# Patient Record
Sex: FEMALE | Race: BLACK OR AFRICAN AMERICAN | NOT HISPANIC OR LATINO | Employment: OTHER | URBAN - METROPOLITAN AREA
[De-identification: names, ages, dates, MRNs, and addresses within clinical notes are randomized per-mention and may not be internally consistent; named-entity substitution may affect disease eponyms.]

---

## 2017-03-08 ENCOUNTER — OFFICE VISIT (OUTPATIENT)
Dept: SURGERY | Facility: CLINIC | Age: 62
End: 2017-03-08

## 2017-03-08 VITALS — OXYGEN SATURATION: 98 % | BODY MASS INDEX: 17.93 KG/M2 | WEIGHT: 105 LBS | HEIGHT: 64 IN | HEART RATE: 95 BPM

## 2017-03-08 DIAGNOSIS — J45.20 MILD INTERMITTENT ASTHMA WITHOUT COMPLICATION: ICD-10-CM

## 2017-03-08 DIAGNOSIS — R13.10 DYSPHAGIA, UNSPECIFIED TYPE: ICD-10-CM

## 2017-03-08 DIAGNOSIS — N18.9 CKD (CHRONIC KIDNEY DISEASE), UNSPECIFIED STAGE: ICD-10-CM

## 2017-03-08 DIAGNOSIS — Z72.0 TOBACCO USE: ICD-10-CM

## 2017-03-08 DIAGNOSIS — K11.8 PAIN OF SUBMANDIBULAR GLAND: ICD-10-CM

## 2017-03-08 DIAGNOSIS — N25.81 SECONDARY HYPERPARATHYROIDISM (HCC): Primary | ICD-10-CM

## 2017-03-08 DIAGNOSIS — I10 ESSENTIAL HYPERTENSION: ICD-10-CM

## 2017-03-08 PROCEDURE — 99204 OFFICE O/P NEW MOD 45 MIN: CPT | Performed by: SURGERY

## 2017-03-08 RX ORDER — PRAVASTATIN SODIUM 10 MG
10 TABLET ORAL NIGHTLY
COMMUNITY
End: 2022-04-08 | Stop reason: SDUPTHER

## 2017-03-08 RX ORDER — METOPROLOL TARTRATE 100 MG/1
100 TABLET ORAL 2 TIMES DAILY
COMMUNITY
Start: 2015-11-06 | End: 2023-02-03

## 2017-03-08 RX ORDER — LIDOCAINE AND PRILOCAINE 25; 25 MG/G; MG/G
CREAM TOPICAL
Refills: 11 | COMMUNITY
Start: 2017-02-22 | End: 2017-05-12

## 2017-03-08 RX ORDER — CINACALCET 60 MG/1
60 TABLET, FILM COATED ORAL EVERY EVENING
COMMUNITY
End: 2017-05-20 | Stop reason: HOSPADM

## 2017-03-08 RX ORDER — AMLODIPINE BESYLATE 10 MG/1
10 TABLET ORAL EVERY MORNING
COMMUNITY

## 2017-03-08 RX ORDER — HYDRALAZINE HYDROCHLORIDE 100 MG/1
100 TABLET, FILM COATED ORAL 3 TIMES DAILY
COMMUNITY
Start: 2016-04-03 | End: 2017-03-30

## 2017-03-08 RX ORDER — NICOTINE 21 MG/24HR
1 PATCH, TRANSDERMAL 24 HOURS TRANSDERMAL EVERY 24 HOURS
COMMUNITY
Start: 2016-04-03 | End: 2022-04-08

## 2017-03-08 RX ORDER — ALBUTEROL SULFATE 90 UG/1
1-2 AEROSOL, METERED RESPIRATORY (INHALATION) EVERY 6 HOURS
COMMUNITY

## 2017-03-17 ENCOUNTER — HOSPITAL ENCOUNTER (OUTPATIENT)
Dept: CT IMAGING | Facility: HOSPITAL | Age: 62
Discharge: HOME | End: 2017-03-17

## 2017-03-17 ENCOUNTER — HOSPITAL ENCOUNTER (OUTPATIENT)
Dept: ULTRASOUND IMAGING | Facility: HOSPITAL | Age: 62
Discharge: HOME | End: 2017-03-17

## 2017-03-17 ENCOUNTER — HOSPITAL ENCOUNTER (OUTPATIENT)
Dept: GENERAL RADIOLOGY | Facility: HOSPITAL | Age: 62
Discharge: HOME | End: 2017-03-17
Admitting: NURSE PRACTITIONER

## 2017-03-17 LAB — CREAT BLDA-MCNC: 8.2 MG/DL (ref 0.6–1.3)

## 2017-03-17 PROCEDURE — 74247: CPT

## 2017-03-17 PROCEDURE — 82565 ASSAY OF CREATININE: CPT

## 2017-03-17 PROCEDURE — 0 IOPAMIDOL 61 % SOLUTION: Performed by: NURSE PRACTITIONER

## 2017-03-17 PROCEDURE — 70492 CT SFT TSUE NCK W/O & W/DYE: CPT

## 2017-03-17 PROCEDURE — 76536 US EXAM OF HEAD AND NECK: CPT

## 2017-03-17 RX ADMIN — IOPAMIDOL 85 ML: 612 INJECTION, SOLUTION INTRAVENOUS at 09:24

## 2017-03-20 ENCOUNTER — TELEPHONE (OUTPATIENT)
Dept: SURGERY | Facility: CLINIC | Age: 62
End: 2017-03-20

## 2017-03-20 DIAGNOSIS — E04.2 MULTIPLE THYROID NODULES: Primary | ICD-10-CM

## 2017-03-20 NOTE — TELEPHONE ENCOUNTER
[place],    Please set her up for ultrasound guided FNA both lobes, right 1.7, left 2.3 cm masses and follow up office visit after that.    Lauren James MD  03/20/17

## 2017-04-03 ENCOUNTER — HOSPITAL ENCOUNTER (OUTPATIENT)
Dept: ULTRASOUND IMAGING | Facility: HOSPITAL | Age: 62
Discharge: HOME | End: 2017-04-03
Admitting: NURSE PRACTITIONER

## 2017-04-03 VITALS
RESPIRATION RATE: 16 BRPM | TEMPERATURE: 97.2 F | BODY MASS INDEX: 19.12 KG/M2 | SYSTOLIC BLOOD PRESSURE: 130 MMHG | DIASTOLIC BLOOD PRESSURE: 77 MMHG | OXYGEN SATURATION: 98 % | HEIGHT: 64 IN | HEART RATE: 73 BPM | WEIGHT: 112 LBS

## 2017-04-03 LAB
INR PPP: 1.2 (ref 0.8–1.2)
PROTHROMBIN TIME: 14.6 SECONDS (ref 12.8–15.2)

## 2017-04-03 PROCEDURE — 88112 CYTOPATH CELL ENHANCE TECH: CPT | Performed by: NURSE PRACTITIONER

## 2017-04-03 PROCEDURE — 76942 ECHO GUIDE FOR BIOPSY: CPT

## 2017-04-03 PROCEDURE — 88305 TISSUE EXAM BY PATHOLOGIST: CPT | Performed by: NURSE PRACTITIONER

## 2017-04-03 PROCEDURE — A9270 NON-COVERED ITEM OR SERVICE: HCPCS | Performed by: RADIOLOGY

## 2017-04-03 PROCEDURE — 88173 CYTOPATH EVAL FNA REPORT: CPT | Performed by: NURSE PRACTITIONER

## 2017-04-03 PROCEDURE — 88172 CYTP DX EVAL FNA 1ST EA SITE: CPT | Performed by: NURSE PRACTITIONER

## 2017-04-03 PROCEDURE — 63710000001 ALPRAZOLAM 0.5 MG TABLET: Performed by: RADIOLOGY

## 2017-04-03 RX ORDER — LIDOCAINE HYDROCHLORIDE 10 MG/ML
10 INJECTION, SOLUTION INFILTRATION; PERINEURAL ONCE
Status: COMPLETED | OUTPATIENT
Start: 2017-04-03 | End: 2017-04-03

## 2017-04-03 RX ORDER — ALPRAZOLAM 0.5 MG/1
0.5 TABLET ORAL ONCE
Status: COMPLETED | OUTPATIENT
Start: 2017-04-03 | End: 2017-04-03

## 2017-04-03 RX ADMIN — ALPRAZOLAM 0.5 MG: 0.5 TABLET ORAL at 12:43

## 2017-04-03 RX ADMIN — LIDOCAINE HYDROCHLORIDE 10 ML: 10 INJECTION, SOLUTION INFILTRATION; PERINEURAL at 13:37

## 2017-04-03 RX ADMIN — LIDOCAINE HYDROCHLORIDE 10 ML: 10 INJECTION, SOLUTION INFILTRATION; PERINEURAL at 13:36

## 2017-04-04 ENCOUNTER — TELEPHONE (OUTPATIENT)
Dept: INTERVENTIONAL RADIOLOGY/VASCULAR | Facility: HOSPITAL | Age: 62
End: 2017-04-04

## 2017-04-07 LAB
CYTO UR: NORMAL
LAB AP CASE REPORT: NORMAL
LAB AP DIAGNOSIS COMMENT: NORMAL
LAB AP NON-GYN SPECIMEN ADEQUACY: NORMAL
Lab: NORMAL
Lab: NORMAL
PATH REPORT.FINAL DX SPEC: NORMAL
PATH REPORT.GROSS SPEC: NORMAL

## 2017-04-10 ENCOUNTER — OFFICE VISIT (OUTPATIENT)
Dept: SURGERY | Facility: CLINIC | Age: 62
End: 2017-04-10

## 2017-04-10 VITALS — HEIGHT: 64 IN | WEIGHT: 105 LBS | HEART RATE: 83 BPM | BODY MASS INDEX: 17.93 KG/M2 | OXYGEN SATURATION: 97 %

## 2017-04-10 DIAGNOSIS — E21.3 HYPERPARATHYROIDISM (HCC): ICD-10-CM

## 2017-04-10 DIAGNOSIS — E04.2 MULTIPLE THYROID NODULES: ICD-10-CM

## 2017-04-10 DIAGNOSIS — Z72.0 TOBACCO USE: ICD-10-CM

## 2017-04-10 DIAGNOSIS — R13.10 DYSPHAGIA, UNSPECIFIED TYPE: Primary | ICD-10-CM

## 2017-04-10 DIAGNOSIS — N18.6 CHRONIC KIDNEY DISEASE WITH END STAGE RENAL FAILURE ON DIALYSIS (HCC): ICD-10-CM

## 2017-04-10 DIAGNOSIS — Z99.2 CHRONIC KIDNEY DISEASE WITH END STAGE RENAL FAILURE ON DIALYSIS (HCC): ICD-10-CM

## 2017-04-10 PROCEDURE — 99214 OFFICE O/P EST MOD 30 MIN: CPT | Performed by: SURGERY

## 2017-04-10 RX ORDER — HYDROCODONE BITARTRATE AND ACETAMINOPHEN 7.5; 325 MG/1; MG/1
TABLET ORAL
Refills: 0 | COMMUNITY
Start: 2017-03-27 | End: 2017-05-12

## 2017-04-10 RX ORDER — GABAPENTIN 300 MG/1
CAPSULE ORAL
Refills: 0 | COMMUNITY
Start: 2017-03-27 | End: 2017-05-12

## 2017-04-10 RX ORDER — SODIUM CHLORIDE 0.9 % (FLUSH) 0.9 %
1-10 SYRINGE (ML) INJECTION AS NEEDED
Status: CANCELLED | OUTPATIENT
Start: 2017-04-10

## 2017-04-10 RX ORDER — TIZANIDINE 4 MG/1
TABLET ORAL
Refills: 0 | COMMUNITY
Start: 2017-03-27 | End: 2017-05-12

## 2017-04-10 NOTE — PROGRESS NOTES
Chief Complaint   Patient presents with   • Follow-up     CT of neck, UGI, FNA & US        HPI    Patient is a 61 y.o. female who is here for follow-up of secondary hyperparathyroidism, with a.history of chronic kidney disease, on dialysis.   Tuesdays Thursdays and Saturdays.  She has a left upper arm graft. She is currently taking sensipar. Upon reviewing records her most recent total calcium was 9.2 on 1/19/2017.  Her intact PTH was 1500.  We have requested more updated labs from Dr. Randhawa.    She denies any abdominal pain, kidney stones, usual fatigue, bone pain, abdominal pain, or decreased concentration. She reports having extreme soreness in her neck and cramping in her legs with muscle pains.  She is hoarse still, with a sense that her throat is closing off.  This is been present for several months. She denies any unintentional weight loss.    Our workup today has not been clear as to why she is having trouble with her throat closing off for her dysphagia.  I did get a CT scan of the neck which showed cystic nodules on the left and right these being here possible lymph node cyst thyroid is an exophytic nodule or perhaps a parathyroid, particularly on the right.  Incidental emphysema was seen.    Based on the cystic nodules on CT, and the ultrasound of her thyroid showing a multinodular goiter, we did send her for an FNA of the right and left nodules, which was done at [facility] with the FNA returning benign, [place] II.    We also got an upper GI there was impingement of the esophagus by the thyroid.  This showed a cervical esophageal when mildly compromises the cervical esophagus and possible gastritis versus neoplasm of the stomach.  I thus suggested an EGD to her today.  We may need to dilate the cervical esophageal web.    She smokes cigarettes family use the nicotine patch on occasion when she is trying to quit.  She tells me she's making progress since our last visit, cutting back on her  cigarettes.    She denies any esophageal cancer lung cancer in her family.     Past Medical History:   Diagnosis Date   • Anemia    • Anxiety    • Asthma    • Coronary artery disease    • Hyperparathyroidism    • Hypertension    • Kidney disease     dialysis     Past Surgical History   Procedure Laterality Date   • Hysterectomy N/A 1989   • Breast biopsy      Left upper arm graft       Family History   Problem Relation Age of Onset   • Heart disease Mother    • Cancer Father      Social History     Social History   • Marital status: Single     Spouse name: N/A   • Number of children: N/A   • Years of education: N/A     Occupational History   • Not on file.     Social History Main Topics   • Smoking status: Current Every Day Smoker     Packs/day: 0.50     Years: 45.00   • Smokeless tobacco: Not on file   • Alcohol use No   • Drug use: Not on file   • Sexual activity: Not on file     Other Topics Concern   • Not on file     Social History Narrative         Current Outpatient Prescriptions:   •  albuterol (PROVENTIL HFA;VENTOLIN HFA) 108 (90 BASE) MCG/ACT inhaler, Inhale 1-2 puffs Every 6 (Six) Hours., Disp: , Rfl:   •  amLODIPine (NORVASC) 10 MG tablet, Take 10 mg by mouth Daily., Disp: , Rfl:   •  cinacalcet (SENSIPAR) 60 MG tablet, Take 60 mg by mouth Every Evening., Disp: , Rfl:   •  gabapentin (NEURONTIN) 300 MG capsule, TK ONE C PO TID, Disp: , Rfl: 0  •  HYDROcodone-acetaminophen (NORCO) 7.5-325 MG per tablet, TK 1 T PO TID PRN, Disp: , Rfl: 0  •  lidocaine-prilocaine (EMLA) 2.5-2.5 % cream, APPLY SMALL AMOUNT TO ACCESS SITE 1 TO 2 HOURS BEFORE DIALYSIS. COVER WITH OCCLUSIVE DRESSING (SARAN WRAP)., Disp: , Rfl: 11  •  metoprolol tartrate (LOPRESSOR) 100 MG tablet, Take 100 mg by mouth 2 (Two) Times a Day., Disp: , Rfl:   •  nicotine (NICODERM CQ) 14 MG/24HR patch, Place 1 patch on the skin Daily., Disp: , Rfl:   •  pravastatin (PRAVACHOL) 10 MG tablet, Take 10 mg by mouth Daily., Disp: , Rfl:   •  Sucroferric  "Oxyhydroxide (VELPHORO) 500 MG chewable tablet, Chew 2 tablets 3 (Three) Times a Day., Disp: , Rfl:   •  tiZANidine (ZANAFLEX) 4 MG tablet, TK 1 T PO  Q 8 H, Disp: , Rfl: 0    No Known Allergies    Preventative Medicine  Colonoscopy: none to date    Review of Systems   Constitutional: Negative for chills, diaphoresis, fatigue, fever and unexpected weight change.   HENT: Positive for trouble swallowing and voice change.    Respiratory: Negative for cough, shortness of breath and wheezing.    Cardiovascular: Negative for chest pain, palpitations and leg swelling.   Gastrointestinal: Negative for abdominal pain.   Musculoskeletal: Negative for arthralgias and back pain.   Neurological: Negative for dizziness and weakness.   Hematological: Does not bruise/bleed easily.   All other systems reviewed and are negative.      Vitals:    04/10/17 1300   Pulse: 83   SpO2: 97%   Weight: 105 lb (47.6 kg)   Height: 64\" (162.6 cm)     Body mass index is 18.02 kg/(m^2).    Physical Exam   Constitutional: She is oriented to person, place, and time. She appears well-developed.   thin   HENT:   Head: Normocephalic and atraumatic.   Eyes: Conjunctivae are normal. No scleral icterus.   Neck: Normal range of motion. No tracheal deviation present.   No distinct mass detected   Cardiovascular: Normal rate, regular rhythm and intact distal pulses.    No bruits   Pulmonary/Chest: Effort normal and breath sounds normal.   Abdominal: Soft. Bowel sounds are normal. She exhibits no distension and no mass. There is no tenderness. There is no rebound and no guarding. No hernia.   Musculoskeletal: Normal range of motion.   Neurological: She is alert and oriented to person, place, and time.   Skin: Skin is warm and dry.   Psychiatric: She has a normal mood and affect.     ASSESSMENT:  · Secondary hyperparathyroidism most recent calcium 9.1, intact PTH 1500 in 1/2017 on Sensipar   · Dysphagia, unchanged, possibly related to esophageal wet in the " cervical region, detected on upper GI,  I'm still uncertain as to whether her thyroid goiter is contributing to her dysphagia  · Gastric wall thickening, possible gastritis versus neoplasm   · CKD (chronic kidney disease with left upper graft on dialysis Tuesdays, Thursdays, and Saturdays)  · Tobacco use   · Hypertension   · Asthma   · Left thyroid 1.7 cm nodule, [place] II  · Right thyroid 2 cm nodule, [place] II  · Sensipar use  · Submandibular gland tenderness resolved    PLAN:  · EGD with possible dilatation of esophageal cervical web  · Plan for parathyroid gland resection ×4, with half gland autotransplantation into the right arm  · Consider addition of thyroid resection at the time of her parathyroid surgery, if symptoms are not abated by esophageal dilatation  · Hold Sensipar 7-10 days prior to surgery  · Vancomycin perioperatively.    We did discuss the plan for a 4 gland resection with half gland autotransplantation again.  We discussed the possibility of adding the thyroidectomy at the time of surgery.       Lauren James MD   04/10/17

## 2017-05-08 ENCOUNTER — HOSPITAL ENCOUNTER (OUTPATIENT)
Facility: HOSPITAL | Age: 62
Setting detail: HOSPITAL OUTPATIENT SURGERY
Discharge: HOME | End: 2017-05-08
Attending: SURGERY | Admitting: SURGERY

## 2017-05-08 ENCOUNTER — PREP FOR SURGERY (OUTPATIENT)
Dept: SURGERY | Facility: CLINIC | Age: 62
End: 2017-05-08

## 2017-05-08 ENCOUNTER — ANESTHESIA EVENT (OUTPATIENT)
Dept: GASTROENTEROLOGY | Facility: HOSPITAL | Age: 62
End: 2017-05-08

## 2017-05-08 ENCOUNTER — ANESTHESIA (OUTPATIENT)
Dept: GASTROENTEROLOGY | Facility: HOSPITAL | Age: 62
End: 2017-05-08

## 2017-05-08 VITALS
OXYGEN SATURATION: 100 % | DIASTOLIC BLOOD PRESSURE: 77 MMHG | WEIGHT: 107.8 LBS | RESPIRATION RATE: 14 BRPM | SYSTOLIC BLOOD PRESSURE: 139 MMHG | HEIGHT: 64 IN | TEMPERATURE: 97.7 F | BODY MASS INDEX: 18.4 KG/M2 | HEART RATE: 74 BPM

## 2017-05-08 DIAGNOSIS — E21.3 HYPERPARATHYROIDISM (HCC): ICD-10-CM

## 2017-05-08 DIAGNOSIS — R13.10 DYSPHAGIA, UNSPECIFIED TYPE: ICD-10-CM

## 2017-05-08 DIAGNOSIS — E04.0 GOITER DIFFUSE: Primary | ICD-10-CM

## 2017-05-08 PROCEDURE — 43450 DILATE ESOPHAGUS 1/MULT PASS: CPT | Performed by: SURGERY

## 2017-05-08 PROCEDURE — 25010000002 PROPOFOL 10 MG/ML EMULSION: Performed by: NURSE ANESTHETIST, CERTIFIED REGISTERED

## 2017-05-08 RX ORDER — SODIUM CHLORIDE 0.9 % (FLUSH) 0.9 %
1-10 SYRINGE (ML) INJECTION AS NEEDED
Status: DISCONTINUED | OUTPATIENT
Start: 2017-05-08 | End: 2017-05-08 | Stop reason: HOSPADM

## 2017-05-08 RX ORDER — SODIUM CHLORIDE 9 MG/ML
30 INJECTION, SOLUTION INTRAVENOUS CONTINUOUS
Status: DISCONTINUED | OUTPATIENT
Start: 2017-05-08 | End: 2017-05-08 | Stop reason: HOSPADM

## 2017-05-08 RX ORDER — LIDOCAINE HYDROCHLORIDE 20 MG/ML
INJECTION, SOLUTION INFILTRATION; PERINEURAL AS NEEDED
Status: DISCONTINUED | OUTPATIENT
Start: 2017-05-08 | End: 2017-05-08 | Stop reason: SURG

## 2017-05-08 RX ORDER — ACETAMINOPHEN 325 MG/1
650 TABLET ORAL ONCE
Status: CANCELLED | OUTPATIENT
Start: 2017-05-08 | End: 2017-05-08

## 2017-05-08 RX ORDER — CEFAZOLIN SODIUM 2 G/100ML
2 INJECTION, SOLUTION INTRAVENOUS ONCE
Status: CANCELLED | OUTPATIENT
Start: 2017-05-08 | End: 2017-05-08

## 2017-05-08 RX ORDER — PROPOFOL 10 MG/ML
VIAL (ML) INTRAVENOUS AS NEEDED
Status: DISCONTINUED | OUTPATIENT
Start: 2017-05-08 | End: 2017-05-08 | Stop reason: SURG

## 2017-05-08 RX ORDER — SODIUM CHLORIDE 0.9 % (FLUSH) 0.9 %
1-10 SYRINGE (ML) INJECTION AS NEEDED
Status: CANCELLED | OUTPATIENT
Start: 2017-05-08

## 2017-05-08 RX ORDER — PROPOFOL 10 MG/ML
VIAL (ML) INTRAVENOUS CONTINUOUS PRN
Status: DISCONTINUED | OUTPATIENT
Start: 2017-05-08 | End: 2017-05-08 | Stop reason: SURG

## 2017-05-08 RX ADMIN — PROPOFOL 200 MCG/KG/MIN: 10 INJECTION, EMULSION INTRAVENOUS at 10:17

## 2017-05-08 RX ADMIN — SODIUM CHLORIDE 30 ML/HR: 9 INJECTION, SOLUTION INTRAVENOUS at 09:42

## 2017-05-08 RX ADMIN — LIDOCAINE HYDROCHLORIDE 80 MG: 20 INJECTION, SOLUTION INFILTRATION; PERINEURAL at 10:17

## 2017-05-08 RX ADMIN — PROPOFOL 50 MG: 10 INJECTION, EMULSION INTRAVENOUS at 10:17

## 2017-05-12 ENCOUNTER — APPOINTMENT (OUTPATIENT)
Dept: PREADMISSION TESTING | Facility: HOSPITAL | Age: 62
End: 2017-05-12

## 2017-05-12 VITALS
OXYGEN SATURATION: 97 % | TEMPERATURE: 98.4 F | HEART RATE: 71 BPM | HEIGHT: 64 IN | WEIGHT: 99 LBS | RESPIRATION RATE: 18 BRPM | SYSTOLIC BLOOD PRESSURE: 107 MMHG | DIASTOLIC BLOOD PRESSURE: 71 MMHG | BODY MASS INDEX: 16.9 KG/M2

## 2017-05-12 DIAGNOSIS — E04.0 GOITER DIFFUSE: ICD-10-CM

## 2017-05-12 DIAGNOSIS — E21.3 HYPERPARATHYROIDISM (HCC): ICD-10-CM

## 2017-05-12 LAB
ALBUMIN SERPL-MCNC: 4.4 G/DL (ref 3.5–5.2)
ALBUMIN/GLOB SERPL: 1.2 G/DL
ALP SERPL-CCNC: 82 U/L (ref 39–117)
ALT SERPL W P-5'-P-CCNC: 10 U/L (ref 1–33)
ANION GAP SERPL CALCULATED.3IONS-SCNC: 19.5 MMOL/L
AST SERPL-CCNC: 17 U/L (ref 1–32)
BASOPHILS # BLD AUTO: 0.04 10*3/MM3 (ref 0–0.2)
BASOPHILS NFR BLD AUTO: 0.5 % (ref 0–1.5)
BILIRUB SERPL-MCNC: 0.3 MG/DL (ref 0.1–1.2)
BUN BLD-MCNC: 19 MG/DL (ref 8–23)
BUN/CREAT SERPL: 4.2 (ref 7–25)
CALCIUM SPEC-SCNC: 10.7 MG/DL (ref 8.6–10.5)
CALCIUM SPEC-SCNC: 10.8 MG/DL (ref 8.6–10.5)
CHLORIDE SERPL-SCNC: 93 MMOL/L (ref 98–107)
CO2 SERPL-SCNC: 26.5 MMOL/L (ref 22–29)
CREAT BLD-MCNC: 4.55 MG/DL (ref 0.57–1)
DEPRECATED RDW RBC AUTO: 55.6 FL (ref 37–54)
EOSINOPHIL # BLD AUTO: 0.15 10*3/MM3 (ref 0–0.7)
EOSINOPHIL NFR BLD AUTO: 1.7 % (ref 0.3–6.2)
ERYTHROCYTE [DISTWIDTH] IN BLOOD BY AUTOMATED COUNT: 15.3 % (ref 11.7–13)
GFR SERPL CREATININE-BSD FRML MDRD: 12 ML/MIN/1.73
GLOBULIN UR ELPH-MCNC: 3.7 GM/DL
GLUCOSE BLD-MCNC: 170 MG/DL (ref 65–99)
HCT VFR BLD AUTO: 41.5 % (ref 35.6–45.5)
HGB BLD-MCNC: 13.5 G/DL (ref 11.9–15.5)
IMM GRANULOCYTES # BLD: 0 10*3/MM3 (ref 0–0.03)
IMM GRANULOCYTES NFR BLD: 0 % (ref 0–0.5)
LYMPHOCYTES # BLD AUTO: 3.12 10*3/MM3 (ref 0.9–4.8)
LYMPHOCYTES NFR BLD AUTO: 35.3 % (ref 19.6–45.3)
MCH RBC QN AUTO: 33.1 PG (ref 26.9–32)
MCHC RBC AUTO-ENTMCNC: 32.5 G/DL (ref 32.4–36.3)
MCV RBC AUTO: 101.7 FL (ref 80.5–98.2)
MONOCYTES # BLD AUTO: 0.84 10*3/MM3 (ref 0.2–1.2)
MONOCYTES NFR BLD AUTO: 9.5 % (ref 5–12)
NEUTROPHILS # BLD AUTO: 4.69 10*3/MM3 (ref 1.9–8.1)
NEUTROPHILS NFR BLD AUTO: 53 % (ref 42.7–76)
PLATELET # BLD AUTO: 199 10*3/MM3 (ref 140–500)
PMV BLD AUTO: 10.6 FL (ref 6–12)
POTASSIUM BLD-SCNC: 4 MMOL/L (ref 3.5–5.2)
PROT SERPL-MCNC: 8.1 G/DL (ref 6–8.5)
PTH-INTACT SERPL-MCNC: 1400 PG/ML (ref 15–65)
RBC # BLD AUTO: 4.08 10*6/MM3 (ref 3.9–5.2)
SODIUM BLD-SCNC: 139 MMOL/L (ref 136–145)
WBC NRBC COR # BLD: 8.84 10*3/MM3 (ref 4.5–10.7)

## 2017-05-12 PROCEDURE — 83970 ASSAY OF PARATHORMONE: CPT | Performed by: SURGERY

## 2017-05-12 PROCEDURE — 36415 COLL VENOUS BLD VENIPUNCTURE: CPT

## 2017-05-12 PROCEDURE — 80053 COMPREHEN METABOLIC PANEL: CPT | Performed by: SURGERY

## 2017-05-12 PROCEDURE — 82652 VIT D 1 25-DIHYDROXY: CPT | Performed by: SURGERY

## 2017-05-12 PROCEDURE — 93005 ELECTROCARDIOGRAM TRACING: CPT

## 2017-05-12 PROCEDURE — 93010 ELECTROCARDIOGRAM REPORT: CPT | Performed by: INTERNAL MEDICINE

## 2017-05-12 PROCEDURE — 85025 COMPLETE CBC W/AUTO DIFF WBC: CPT | Performed by: SURGERY

## 2017-05-12 RX ORDER — TIZANIDINE 4 MG/1
4 TABLET ORAL EVERY 8 HOURS PRN
COMMUNITY

## 2017-05-12 RX ORDER — GABAPENTIN 300 MG/1
600 CAPSULE ORAL 3 TIMES DAILY
COMMUNITY
End: 2023-02-03

## 2017-05-12 RX ORDER — HYDROCODONE BITARTRATE AND ACETAMINOPHEN 7.5; 325 MG/1; MG/1
1 TABLET ORAL EVERY 6 HOURS PRN
COMMUNITY

## 2017-05-16 LAB — 1,25(OH)2D3 SERPL-MCNC: 120 PG/ML (ref 19.9–79.3)

## 2017-05-17 ENCOUNTER — HOSPITAL ENCOUNTER (OUTPATIENT)
Facility: HOSPITAL | Age: 62
Setting detail: OBSERVATION
LOS: 1 days | Discharge: HOME | End: 2017-05-20
Attending: SURGERY | Admitting: SURGERY

## 2017-05-17 ENCOUNTER — ANESTHESIA EVENT (OUTPATIENT)
Dept: PERIOP | Facility: HOSPITAL | Age: 62
End: 2017-05-17

## 2017-05-17 ENCOUNTER — ANESTHESIA (OUTPATIENT)
Dept: PERIOP | Facility: HOSPITAL | Age: 62
End: 2017-05-17

## 2017-05-17 DIAGNOSIS — E21.3 HYPERPARATHYROIDISM (HCC): ICD-10-CM

## 2017-05-17 DIAGNOSIS — E04.0 GOITER DIFFUSE: ICD-10-CM

## 2017-05-17 LAB
ANION GAP SERPL CALCULATED.3IONS-SCNC: 21 MMOL/L
BUN BLD-MCNC: 46 MG/DL (ref 8–23)
BUN/CREAT SERPL: 5 (ref 7–25)
CALCIUM SPEC-SCNC: 10.9 MG/DL (ref 8.6–10.5)
CALCIUM SPEC-SCNC: 9.4 MG/DL (ref 8.6–10.5)
CHLORIDE SERPL-SCNC: 92 MMOL/L (ref 98–107)
CO2 SERPL-SCNC: 24 MMOL/L (ref 22–29)
CREAT BLD-MCNC: 9.28 MG/DL (ref 0.57–1)
GFR SERPL CREATININE-BSD FRML MDRD: 5 ML/MIN/1.73
GLUCOSE BLD-MCNC: 88 MG/DL (ref 65–99)
POTASSIUM BLD-SCNC: 5.2 MMOL/L (ref 3.5–5.2)
PTH-INTACT SERPL-SCNC: 107.1 PG/ML (ref 15–65)
PTH-INTACT SERPL-SCNC: 1408 PG/ML (ref 15–65)
SODIUM BLD-SCNC: 137 MMOL/L (ref 136–145)

## 2017-05-17 PROCEDURE — 25010000002 ONDANSETRON PER 1 MG: Performed by: ANESTHESIOLOGY

## 2017-05-17 PROCEDURE — 60500 EXPLORE PARATHYROID GLANDS: CPT | Performed by: PHYSICIAN ASSISTANT

## 2017-05-17 PROCEDURE — 25010000002 HYDROMORPHONE PER 4 MG: Performed by: SURGERY

## 2017-05-17 PROCEDURE — 88331 PATH CONSLTJ SURG 1 BLK 1SPC: CPT | Performed by: SURGERY

## 2017-05-17 PROCEDURE — 25010000003 CEFAZOLIN IN DEXTROSE 2-4 GM/100ML-% SOLUTION: Performed by: SURGERY

## 2017-05-17 PROCEDURE — 25010000002 PROPOFOL 10 MG/ML EMULSION: Performed by: NURSE ANESTHETIST, CERTIFIED REGISTERED

## 2017-05-17 PROCEDURE — 82310 ASSAY OF CALCIUM: CPT | Performed by: SURGERY

## 2017-05-17 PROCEDURE — 60500 EXPLORE PARATHYROID GLANDS: CPT | Performed by: SURGERY

## 2017-05-17 PROCEDURE — 60240 REMOVAL OF THYROID: CPT | Performed by: SURGERY

## 2017-05-17 PROCEDURE — 60240 REMOVAL OF THYROID: CPT | Performed by: PHYSICIAN ASSISTANT

## 2017-05-17 PROCEDURE — 25010000002 VANCOMYCIN PER 500 MG: Performed by: SURGERY

## 2017-05-17 PROCEDURE — 25010000002 FENTANYL CITRATE (PF) 100 MCG/2ML SOLUTION

## 2017-05-17 PROCEDURE — 25010000002 FENTANYL CITRATE (PF) 100 MCG/2ML SOLUTION: Performed by: NURSE ANESTHETIST, CERTIFIED REGISTERED

## 2017-05-17 PROCEDURE — 25010000002 MIDAZOLAM PER 1 MG: Performed by: ANESTHESIOLOGY

## 2017-05-17 PROCEDURE — 88305 TISSUE EXAM BY PATHOLOGIST: CPT | Performed by: SURGERY

## 2017-05-17 PROCEDURE — 60512 AUTOTRANSPLANT PARATHYROID: CPT | Performed by: SURGERY

## 2017-05-17 PROCEDURE — 60512 AUTOTRANSPLANT PARATHYROID: CPT | Performed by: PHYSICIAN ASSISTANT

## 2017-05-17 PROCEDURE — 25010000002 HYDROMORPHONE PER 4 MG: Performed by: NURSE ANESTHETIST, CERTIFIED REGISTERED

## 2017-05-17 PROCEDURE — 80048 BASIC METABOLIC PNL TOTAL CA: CPT | Performed by: SURGERY

## 2017-05-17 PROCEDURE — 83970 ASSAY OF PARATHORMONE: CPT | Performed by: SURGERY

## 2017-05-17 PROCEDURE — 88307 TISSUE EXAM BY PATHOLOGIST: CPT | Performed by: SURGERY

## 2017-05-17 RX ORDER — NITROGLYCERIN 0.4 MG/1
0.4 TABLET SUBLINGUAL
Status: DISCONTINUED | OUTPATIENT
Start: 2017-05-17 | End: 2017-05-20 | Stop reason: HOSPADM

## 2017-05-17 RX ORDER — HYDROMORPHONE HYDROCHLORIDE 1 MG/ML
0.5 INJECTION, SOLUTION INTRAMUSCULAR; INTRAVENOUS; SUBCUTANEOUS
Status: DISCONTINUED | OUTPATIENT
Start: 2017-05-17 | End: 2017-05-17 | Stop reason: HOSPADM

## 2017-05-17 RX ORDER — IBUPROFEN 200 MG
1250 CAPSULE ORAL 4 TIMES DAILY
Status: DISCONTINUED | OUTPATIENT
Start: 2017-05-17 | End: 2017-05-18

## 2017-05-17 RX ORDER — PROPOFOL 10 MG/ML
VIAL (ML) INTRAVENOUS AS NEEDED
Status: DISCONTINUED | OUTPATIENT
Start: 2017-05-17 | End: 2017-05-17 | Stop reason: SURG

## 2017-05-17 RX ORDER — FENTANYL CITRATE 50 UG/ML
INJECTION, SOLUTION INTRAMUSCULAR; INTRAVENOUS AS NEEDED
Status: DISCONTINUED | OUTPATIENT
Start: 2017-05-17 | End: 2017-05-17 | Stop reason: SURG

## 2017-05-17 RX ORDER — ONDANSETRON 4 MG/1
4 TABLET, FILM COATED ORAL EVERY 6 HOURS PRN
Status: DISCONTINUED | OUTPATIENT
Start: 2017-05-17 | End: 2017-05-20 | Stop reason: HOSPADM

## 2017-05-17 RX ORDER — BUPIVACAINE HYDROCHLORIDE AND EPINEPHRINE 5; 5 MG/ML; UG/ML
INJECTION, SOLUTION PERINEURAL AS NEEDED
Status: DISCONTINUED | OUTPATIENT
Start: 2017-05-17 | End: 2017-05-17 | Stop reason: HOSPADM

## 2017-05-17 RX ORDER — FENTANYL CITRATE 50 UG/ML
50 INJECTION, SOLUTION INTRAMUSCULAR; INTRAVENOUS
Status: DISCONTINUED | OUTPATIENT
Start: 2017-05-17 | End: 2017-05-17 | Stop reason: HOSPADM

## 2017-05-17 RX ORDER — PROMETHAZINE HYDROCHLORIDE 25 MG/1
25 TABLET ORAL ONCE AS NEEDED
Status: DISCONTINUED | OUTPATIENT
Start: 2017-05-17 | End: 2017-05-17 | Stop reason: HOSPADM

## 2017-05-17 RX ORDER — PROMETHAZINE HYDROCHLORIDE 25 MG/ML
12.5 INJECTION, SOLUTION INTRAMUSCULAR; INTRAVENOUS EVERY 6 HOURS PRN
Status: DISCONTINUED | OUTPATIENT
Start: 2017-05-17 | End: 2017-05-20 | Stop reason: HOSPADM

## 2017-05-17 RX ORDER — PROMETHAZINE HYDROCHLORIDE 25 MG/ML
12.5 INJECTION, SOLUTION INTRAMUSCULAR; INTRAVENOUS EVERY 4 HOURS PRN
Status: DISCONTINUED | OUTPATIENT
Start: 2017-05-17 | End: 2017-05-20 | Stop reason: HOSPADM

## 2017-05-17 RX ORDER — HYDROMORPHONE HYDROCHLORIDE 1 MG/ML
0.5 INJECTION, SOLUTION INTRAMUSCULAR; INTRAVENOUS; SUBCUTANEOUS
Status: DISCONTINUED | OUTPATIENT
Start: 2017-05-17 | End: 2017-05-20 | Stop reason: HOSPADM

## 2017-05-17 RX ORDER — TIZANIDINE 4 MG/1
4 TABLET ORAL EVERY 8 HOURS PRN
Status: DISCONTINUED | OUTPATIENT
Start: 2017-05-17 | End: 2017-05-20 | Stop reason: HOSPADM

## 2017-05-17 RX ORDER — LABETALOL HYDROCHLORIDE 5 MG/ML
5 INJECTION, SOLUTION INTRAVENOUS
Status: DISCONTINUED | OUTPATIENT
Start: 2017-05-17 | End: 2017-05-17 | Stop reason: HOSPADM

## 2017-05-17 RX ORDER — ALBUTEROL SULFATE 2.5 MG/3ML
2.5 SOLUTION RESPIRATORY (INHALATION) ONCE AS NEEDED
Status: DISCONTINUED | OUTPATIENT
Start: 2017-05-17 | End: 2017-05-17 | Stop reason: HOSPADM

## 2017-05-17 RX ORDER — HYDROCODONE BITARTRATE AND ACETAMINOPHEN 5; 325 MG/1; MG/1
1 TABLET ORAL EVERY 4 HOURS PRN
Status: DISCONTINUED | OUTPATIENT
Start: 2017-05-17 | End: 2017-05-20 | Stop reason: HOSPADM

## 2017-05-17 RX ORDER — LIDOCAINE HYDROCHLORIDE 20 MG/ML
INJECTION, SOLUTION INFILTRATION; PERINEURAL AS NEEDED
Status: DISCONTINUED | OUTPATIENT
Start: 2017-05-17 | End: 2017-05-17 | Stop reason: SURG

## 2017-05-17 RX ORDER — SODIUM CHLORIDE, SODIUM LACTATE, POTASSIUM CHLORIDE, CALCIUM CHLORIDE 600; 310; 30; 20 MG/100ML; MG/100ML; MG/100ML; MG/100ML
100 INJECTION, SOLUTION INTRAVENOUS CONTINUOUS
Status: DISCONTINUED | OUTPATIENT
Start: 2017-05-17 | End: 2017-05-17

## 2017-05-17 RX ORDER — MIDAZOLAM HYDROCHLORIDE 1 MG/ML
2 INJECTION INTRAMUSCULAR; INTRAVENOUS
Status: DISCONTINUED | OUTPATIENT
Start: 2017-05-17 | End: 2017-05-17 | Stop reason: HOSPADM

## 2017-05-17 RX ORDER — NALOXONE HCL 0.4 MG/ML
0.2 VIAL (ML) INJECTION AS NEEDED
Status: DISCONTINUED | OUTPATIENT
Start: 2017-05-17 | End: 2017-05-17 | Stop reason: HOSPADM

## 2017-05-17 RX ORDER — PROMETHAZINE HYDROCHLORIDE 25 MG/ML
12.5 INJECTION, SOLUTION INTRAMUSCULAR; INTRAVENOUS ONCE AS NEEDED
Status: DISCONTINUED | OUTPATIENT
Start: 2017-05-17 | End: 2017-05-17 | Stop reason: HOSPADM

## 2017-05-17 RX ORDER — SODIUM CHLORIDE, SODIUM LACTATE, POTASSIUM CHLORIDE, CALCIUM CHLORIDE 600; 310; 30; 20 MG/100ML; MG/100ML; MG/100ML; MG/100ML
9 INJECTION, SOLUTION INTRAVENOUS CONTINUOUS
Status: DISCONTINUED | OUTPATIENT
Start: 2017-05-17 | End: 2017-05-17

## 2017-05-17 RX ORDER — SODIUM CHLORIDE 0.9 % (FLUSH) 0.9 %
1-10 SYRINGE (ML) INJECTION AS NEEDED
Status: DISCONTINUED | OUTPATIENT
Start: 2017-05-17 | End: 2017-05-17 | Stop reason: HOSPADM

## 2017-05-17 RX ORDER — PROMETHAZINE HYDROCHLORIDE 25 MG/ML
5 INJECTION, SOLUTION INTRAMUSCULAR; INTRAVENOUS
Status: DISCONTINUED | OUTPATIENT
Start: 2017-05-17 | End: 2017-05-17 | Stop reason: HOSPADM

## 2017-05-17 RX ORDER — HYDROCODONE BITARTRATE AND ACETAMINOPHEN 7.5; 325 MG/1; MG/1
1 TABLET ORAL EVERY 6 HOURS PRN
Status: DISCONTINUED | OUTPATIENT
Start: 2017-05-17 | End: 2017-05-20 | Stop reason: HOSPADM

## 2017-05-17 RX ORDER — GABAPENTIN 300 MG/1
300 CAPSULE ORAL 3 TIMES DAILY
Status: DISCONTINUED | OUTPATIENT
Start: 2017-05-17 | End: 2017-05-20 | Stop reason: HOSPADM

## 2017-05-17 RX ORDER — CEFAZOLIN SODIUM 2 G/100ML
2 INJECTION, SOLUTION INTRAVENOUS ONCE
Status: COMPLETED | OUTPATIENT
Start: 2017-05-17 | End: 2017-05-17

## 2017-05-17 RX ORDER — CALCIUM CARBONATE 200(500)MG
2 TABLET,CHEWABLE ORAL
Status: DISCONTINUED | OUTPATIENT
Start: 2017-05-17 | End: 2017-05-17

## 2017-05-17 RX ORDER — PROMETHAZINE HYDROCHLORIDE 25 MG/1
25 SUPPOSITORY RECTAL ONCE AS NEEDED
Status: DISCONTINUED | OUTPATIENT
Start: 2017-05-17 | End: 2017-05-17 | Stop reason: HOSPADM

## 2017-05-17 RX ORDER — AMLODIPINE BESYLATE 10 MG/1
10 TABLET ORAL EVERY MORNING
Status: DISCONTINUED | OUTPATIENT
Start: 2017-05-18 | End: 2017-05-20 | Stop reason: HOSPADM

## 2017-05-17 RX ORDER — ONDANSETRON 2 MG/ML
INJECTION INTRAMUSCULAR; INTRAVENOUS AS NEEDED
Status: DISCONTINUED | OUTPATIENT
Start: 2017-05-17 | End: 2017-05-17 | Stop reason: SURG

## 2017-05-17 RX ORDER — METOCLOPRAMIDE HYDROCHLORIDE 5 MG/ML
10 INJECTION INTRAMUSCULAR; INTRAVENOUS EVERY 6 HOURS PRN
Status: DISCONTINUED | OUTPATIENT
Start: 2017-05-17 | End: 2017-05-20 | Stop reason: HOSPADM

## 2017-05-17 RX ORDER — MAGNESIUM HYDROXIDE 1200 MG/15ML
LIQUID ORAL AS NEEDED
Status: DISCONTINUED | OUTPATIENT
Start: 2017-05-17 | End: 2017-05-17 | Stop reason: HOSPADM

## 2017-05-17 RX ORDER — SODIUM CHLORIDE 450 MG/100ML
45 INJECTION, SOLUTION INTRAVENOUS CONTINUOUS
Status: DISCONTINUED | OUTPATIENT
Start: 2017-05-17 | End: 2017-05-17

## 2017-05-17 RX ORDER — HYDRALAZINE HYDROCHLORIDE 20 MG/ML
5 INJECTION INTRAMUSCULAR; INTRAVENOUS
Status: DISCONTINUED | OUTPATIENT
Start: 2017-05-17 | End: 2017-05-17 | Stop reason: HOSPADM

## 2017-05-17 RX ORDER — SODIUM CHLORIDE 9 MG/ML
50 INJECTION, SOLUTION INTRAVENOUS CONTINUOUS
Status: DISCONTINUED | OUTPATIENT
Start: 2017-05-17 | End: 2017-05-19

## 2017-05-17 RX ORDER — FLUMAZENIL 0.1 MG/ML
0.2 INJECTION INTRAVENOUS AS NEEDED
Status: DISCONTINUED | OUTPATIENT
Start: 2017-05-17 | End: 2017-05-17 | Stop reason: HOSPADM

## 2017-05-17 RX ORDER — NALOXONE HCL 0.4 MG/ML
0.1 VIAL (ML) INJECTION
Status: DISCONTINUED | OUTPATIENT
Start: 2017-05-17 | End: 2017-05-20 | Stop reason: HOSPADM

## 2017-05-17 RX ORDER — CALCIUM CARBONATE 200(500)MG
3 TABLET,CHEWABLE ORAL
Status: DISCONTINUED | OUTPATIENT
Start: 2017-05-18 | End: 2017-05-17

## 2017-05-17 RX ORDER — ONDANSETRON 4 MG/1
4 TABLET, ORALLY DISINTEGRATING ORAL EVERY 6 HOURS PRN
Status: DISCONTINUED | OUTPATIENT
Start: 2017-05-17 | End: 2017-05-20 | Stop reason: HOSPADM

## 2017-05-17 RX ORDER — SODIUM CHLORIDE 9 MG/ML
9 INJECTION, SOLUTION INTRAVENOUS CONTINUOUS
Status: DISCONTINUED | OUTPATIENT
Start: 2017-05-17 | End: 2017-05-17

## 2017-05-17 RX ORDER — MIDAZOLAM HYDROCHLORIDE 1 MG/ML
1 INJECTION INTRAMUSCULAR; INTRAVENOUS
Status: DISCONTINUED | OUTPATIENT
Start: 2017-05-17 | End: 2017-05-17 | Stop reason: HOSPADM

## 2017-05-17 RX ORDER — HYDROCODONE BITARTRATE AND ACETAMINOPHEN 5; 325 MG/1; MG/1
1 TABLET ORAL ONCE AS NEEDED
Status: DISCONTINUED | OUTPATIENT
Start: 2017-05-17 | End: 2017-05-20 | Stop reason: HOSPADM

## 2017-05-17 RX ORDER — ROCURONIUM BROMIDE 10 MG/ML
INJECTION, SOLUTION INTRAVENOUS AS NEEDED
Status: DISCONTINUED | OUTPATIENT
Start: 2017-05-17 | End: 2017-05-17 | Stop reason: SURG

## 2017-05-17 RX ORDER — FENTANYL CITRATE 50 UG/ML
INJECTION, SOLUTION INTRAMUSCULAR; INTRAVENOUS
Status: COMPLETED
Start: 2017-05-17 | End: 2017-05-17

## 2017-05-17 RX ORDER — ONDANSETRON 2 MG/ML
4 INJECTION INTRAMUSCULAR; INTRAVENOUS EVERY 6 HOURS PRN
Status: DISCONTINUED | OUTPATIENT
Start: 2017-05-17 | End: 2017-05-20 | Stop reason: HOSPADM

## 2017-05-17 RX ORDER — FAMOTIDINE 10 MG/ML
20 INJECTION, SOLUTION INTRAVENOUS ONCE
Status: COMPLETED | OUTPATIENT
Start: 2017-05-17 | End: 2017-05-17

## 2017-05-17 RX ORDER — ACETAMINOPHEN 325 MG/1
650 TABLET ORAL ONCE
Status: COMPLETED | OUTPATIENT
Start: 2017-05-17 | End: 2017-05-17

## 2017-05-17 RX ORDER — NALOXONE HCL 0.4 MG/ML
0.4 VIAL (ML) INJECTION
Status: DISCONTINUED | OUTPATIENT
Start: 2017-05-17 | End: 2017-05-20 | Stop reason: HOSPADM

## 2017-05-17 RX ORDER — ALBUTEROL SULFATE 2.5 MG/3ML
2.5 SOLUTION RESPIRATORY (INHALATION) EVERY 4 HOURS PRN
Status: DISCONTINUED | OUTPATIENT
Start: 2017-05-17 | End: 2017-05-20 | Stop reason: HOSPADM

## 2017-05-17 RX ORDER — ACETAMINOPHEN 325 MG/1
650 TABLET ORAL EVERY 4 HOURS PRN
Status: DISCONTINUED | OUTPATIENT
Start: 2017-05-17 | End: 2017-05-20 | Stop reason: HOSPADM

## 2017-05-17 RX ORDER — ONDANSETRON 2 MG/ML
4 INJECTION INTRAMUSCULAR; INTRAVENOUS ONCE AS NEEDED
Status: DISCONTINUED | OUTPATIENT
Start: 2017-05-17 | End: 2017-05-17 | Stop reason: HOSPADM

## 2017-05-17 RX ORDER — CALCITRIOL 0.25 UG/1
0.25 CAPSULE, LIQUID FILLED ORAL EVERY 12 HOURS SCHEDULED
Status: DISCONTINUED | OUTPATIENT
Start: 2017-05-17 | End: 2017-05-20 | Stop reason: HOSPADM

## 2017-05-17 RX ORDER — METOPROLOL TARTRATE 100 MG/1
100 TABLET ORAL EVERY 12 HOURS SCHEDULED
Status: DISCONTINUED | OUTPATIENT
Start: 2017-05-17 | End: 2017-05-20 | Stop reason: HOSPADM

## 2017-05-17 RX ORDER — DIPHENHYDRAMINE HYDROCHLORIDE 50 MG/ML
12.5 INJECTION INTRAMUSCULAR; INTRAVENOUS
Status: DISCONTINUED | OUTPATIENT
Start: 2017-05-17 | End: 2017-05-17 | Stop reason: HOSPADM

## 2017-05-17 RX ADMIN — PROPOFOL 100 MG: 10 INJECTION, EMULSION INTRAVENOUS at 14:16

## 2017-05-17 RX ADMIN — CALCIUM CARBONATE 1250 MG: 1250 TABLET ORAL at 23:46

## 2017-05-17 RX ADMIN — HYDROMORPHONE HYDROCHLORIDE 0.5 MG: 1 INJECTION, SOLUTION INTRAMUSCULAR; INTRAVENOUS; SUBCUTANEOUS at 19:51

## 2017-05-17 RX ADMIN — FENTANYL CITRATE 50 MCG: 50 INJECTION INTRAMUSCULAR; INTRAVENOUS at 16:40

## 2017-05-17 RX ADMIN — FENTANYL CITRATE 50 MCG: 50 INJECTION INTRAMUSCULAR; INTRAVENOUS at 17:24

## 2017-05-17 RX ADMIN — CALCITRIOL 0.25 MCG: 0.25 CAPSULE, LIQUID FILLED ORAL at 23:46

## 2017-05-17 RX ADMIN — SODIUM CHLORIDE 9 ML/HR: 9 INJECTION, SOLUTION INTRAVENOUS at 13:45

## 2017-05-17 RX ADMIN — FENTANYL CITRATE 50 MCG: 50 INJECTION INTRAMUSCULAR; INTRAVENOUS at 18:16

## 2017-05-17 RX ADMIN — FENTANYL CITRATE 50 MCG: 50 INJECTION INTRAMUSCULAR; INTRAVENOUS at 19:35

## 2017-05-17 RX ADMIN — EPHEDRINE SULFATE 5 MG: 50 INJECTION INTRAMUSCULAR; INTRAVENOUS; SUBCUTANEOUS at 14:35

## 2017-05-17 RX ADMIN — CEFAZOLIN SODIUM 2 G: 2 INJECTION, SOLUTION INTRAVENOUS at 14:12

## 2017-05-17 RX ADMIN — ONDANSETRON 4 MG: 2 INJECTION INTRAMUSCULAR; INTRAVENOUS at 16:01

## 2017-05-17 RX ADMIN — SUGAMMADEX 200 MG: 100 INJECTION, SOLUTION INTRAVENOUS at 16:25

## 2017-05-17 RX ADMIN — FENTANYL CITRATE 50 MCG: 50 INJECTION INTRAMUSCULAR; INTRAVENOUS at 15:30

## 2017-05-17 RX ADMIN — HYDROMORPHONE HYDROCHLORIDE 0.5 MG: 1 INJECTION, SOLUTION INTRAMUSCULAR; INTRAVENOUS; SUBCUTANEOUS at 18:34

## 2017-05-17 RX ADMIN — MIDAZOLAM 2 MG: 1 INJECTION INTRAMUSCULAR; INTRAVENOUS at 13:51

## 2017-05-17 RX ADMIN — SODIUM CHLORIDE 50 ML/HR: 9 INJECTION, SOLUTION INTRAVENOUS at 20:54

## 2017-05-17 RX ADMIN — HYDROCODONE BITARTRATE AND ACETAMINOPHEN 1 TABLET: 5; 325 TABLET ORAL at 20:53

## 2017-05-17 RX ADMIN — ACETAMINOPHEN 650 MG: 325 TABLET ORAL at 12:07

## 2017-05-17 RX ADMIN — GABAPENTIN 300 MG: 300 CAPSULE ORAL at 23:46

## 2017-05-17 RX ADMIN — EPHEDRINE SULFATE 5 MG: 50 INJECTION INTRAMUSCULAR; INTRAVENOUS; SUBCUTANEOUS at 14:40

## 2017-05-17 RX ADMIN — FAMOTIDINE 20 MG: 10 INJECTION, SOLUTION INTRAVENOUS at 13:51

## 2017-05-17 RX ADMIN — ROCURONIUM BROMIDE 30 MG: 10 INJECTION INTRAVENOUS at 14:16

## 2017-05-17 RX ADMIN — ROCURONIUM BROMIDE 20 MG: 10 INJECTION INTRAVENOUS at 15:15

## 2017-05-17 RX ADMIN — LIDOCAINE HYDROCHLORIDE 40 MG: 20 INJECTION, SOLUTION INFILTRATION; PERINEURAL at 14:16

## 2017-05-17 RX ADMIN — HYDROMORPHONE HYDROCHLORIDE 0.5 MG: 1 INJECTION, SOLUTION INTRAMUSCULAR; INTRAVENOUS; SUBCUTANEOUS at 17:59

## 2017-05-17 RX ADMIN — HYDROMORPHONE HYDROCHLORIDE 0.5 MG: 1 INJECTION, SOLUTION INTRAMUSCULAR; INTRAVENOUS; SUBCUTANEOUS at 23:50

## 2017-05-17 RX ADMIN — FENTANYL CITRATE 50 MCG: 50 INJECTION INTRAMUSCULAR; INTRAVENOUS at 14:12

## 2017-05-17 RX ADMIN — EPHEDRINE SULFATE 5 MG: 50 INJECTION INTRAMUSCULAR; INTRAVENOUS; SUBCUTANEOUS at 14:30

## 2017-05-17 RX ADMIN — VANCOMYCIN HYDROCHLORIDE 500 MG: 500 INJECTION, POWDER, LYOPHILIZED, FOR SOLUTION INTRAVENOUS at 13:46

## 2017-05-18 PROBLEM — E04.0 GOITER DIFFUSE: Status: ACTIVE | Noted: 2017-05-18

## 2017-05-18 LAB
ALBUMIN SERPL-MCNC: 3.3 G/DL (ref 3.5–5.2)
ANION GAP SERPL CALCULATED.3IONS-SCNC: 19.2 MMOL/L
BUN BLD-MCNC: 48 MG/DL (ref 8–23)
BUN/CREAT SERPL: 4.9 (ref 7–25)
CA-I BLD-MCNC: 4.2 MG/DL (ref 4.6–5.4)
CA-I BLD-MCNC: 4.5 MG/DL (ref 4.6–5.4)
CA-I BLD-MCNC: 4.8 MG/DL (ref 4.6–5.4)
CA-I SERPL ISE-MCNC: 1.06 MMOL/L (ref 1.1–1.35)
CA-I SERPL ISE-MCNC: 1.13 MMOL/L (ref 1.1–1.35)
CA-I SERPL ISE-MCNC: 1.19 MMOL/L (ref 1.1–1.35)
CALCIUM SPEC-SCNC: 8 MG/DL (ref 8.6–10.5)
CALCIUM SPEC-SCNC: 8.2 MG/DL (ref 8.6–10.5)
CHLORIDE SERPL-SCNC: 92 MMOL/L (ref 98–107)
CO2 SERPL-SCNC: 19.8 MMOL/L (ref 22–29)
CREAT BLD-MCNC: 9.75 MG/DL (ref 0.57–1)
GFR SERPL CREATININE-BSD FRML MDRD: 5 ML/MIN/1.73
GLUCOSE BLD-MCNC: 71 MG/DL (ref 65–99)
MAGNESIUM SERPL-MCNC: 1.9 MG/DL (ref 1.6–2.4)
MAGNESIUM SERPL-MCNC: 2.1 MG/DL (ref 1.6–2.4)
PHOSPHATE SERPL-MCNC: 10.4 MG/DL (ref 2.5–4.5)
PHOSPHATE SERPL-MCNC: 10.4 MG/DL (ref 2.5–4.5)
PHOSPHATE SERPL-MCNC: 4.7 MG/DL (ref 2.5–4.5)
POTASSIUM BLD-SCNC: 6.6 MMOL/L (ref 3.5–5.2)
PTH-INTACT SERPL-MCNC: 122.7 PG/ML (ref 15–65)
SODIUM BLD-SCNC: 131 MMOL/L (ref 136–145)

## 2017-05-18 PROCEDURE — G0378 HOSPITAL OBSERVATION PER HR: HCPCS

## 2017-05-18 PROCEDURE — 82330 ASSAY OF CALCIUM: CPT | Performed by: INTERNAL MEDICINE

## 2017-05-18 PROCEDURE — 83970 ASSAY OF PARATHORMONE: CPT | Performed by: SURGERY

## 2017-05-18 PROCEDURE — 82310 ASSAY OF CALCIUM: CPT | Performed by: SURGERY

## 2017-05-18 PROCEDURE — G0257 UNSCHED DIALYSIS ESRD PT HOS: HCPCS

## 2017-05-18 PROCEDURE — 99024 POSTOP FOLLOW-UP VISIT: CPT | Performed by: SURGERY

## 2017-05-18 PROCEDURE — 84100 ASSAY OF PHOSPHORUS: CPT | Performed by: INTERNAL MEDICINE

## 2017-05-18 PROCEDURE — 80069 RENAL FUNCTION PANEL: CPT | Performed by: INTERNAL MEDICINE

## 2017-05-18 PROCEDURE — 83735 ASSAY OF MAGNESIUM: CPT | Performed by: INTERNAL MEDICINE

## 2017-05-18 PROCEDURE — 25010000002 HYDROMORPHONE PER 4 MG: Performed by: SURGERY

## 2017-05-18 RX ORDER — SEVELAMER CARBONATE 800 MG/1
2400 TABLET, FILM COATED ORAL
Status: DISCONTINUED | OUTPATIENT
Start: 2017-05-18 | End: 2017-05-18

## 2017-05-18 RX ORDER — IBUPROFEN 200 MG
1250 CAPSULE ORAL 2 TIMES DAILY
Status: DISCONTINUED | OUTPATIENT
Start: 2017-05-19 | End: 2017-05-20 | Stop reason: HOSPADM

## 2017-05-18 RX ORDER — SEVELAMER CARBONATE 800 MG/1
2400 TABLET, FILM COATED ORAL
Status: DISCONTINUED | OUTPATIENT
Start: 2017-05-19 | End: 2017-05-20 | Stop reason: HOSPADM

## 2017-05-18 RX ORDER — LEVOTHYROXINE SODIUM 0.07 MG/1
75 TABLET ORAL
Status: DISCONTINUED | OUTPATIENT
Start: 2017-05-18 | End: 2017-05-20 | Stop reason: HOSPADM

## 2017-05-18 RX ADMIN — SODIUM CHLORIDE 50 ML/HR: 9 INJECTION, SOLUTION INTRAVENOUS at 17:49

## 2017-05-18 RX ADMIN — METOPROLOL TARTRATE 100 MG: 100 TABLET ORAL at 22:08

## 2017-05-18 RX ADMIN — HYDROMORPHONE HYDROCHLORIDE 0.5 MG: 1 INJECTION, SOLUTION INTRAMUSCULAR; INTRAVENOUS; SUBCUTANEOUS at 05:37

## 2017-05-18 RX ADMIN — SEVELAMER CARBONATE 800 MG: 800 TABLET, FILM COATED ORAL at 17:49

## 2017-05-18 RX ADMIN — CALCIUM CARBONATE 1250 MG: 1250 TABLET ORAL at 17:49

## 2017-05-18 RX ADMIN — SEVELAMER CARBONATE 2400 MG: 800 TABLET, FILM COATED ORAL at 14:25

## 2017-05-18 RX ADMIN — CALCIUM CARBONATE 1250 MG: 1250 TABLET ORAL at 14:25

## 2017-05-18 RX ADMIN — CALCITRIOL 0.25 MCG: 0.25 CAPSULE, LIQUID FILLED ORAL at 22:04

## 2017-05-18 RX ADMIN — HYDROCODONE BITARTRATE AND ACETAMINOPHEN 1 TABLET: 5; 325 TABLET ORAL at 09:03

## 2017-05-18 RX ADMIN — GABAPENTIN 300 MG: 300 CAPSULE ORAL at 16:25

## 2017-05-18 RX ADMIN — AMLODIPINE BESYLATE 10 MG: 10 TABLET ORAL at 14:26

## 2017-05-18 RX ADMIN — HYDROCODONE BITARTRATE AND ACETAMINOPHEN 1 TABLET: 5; 325 TABLET ORAL at 14:25

## 2017-05-18 RX ADMIN — GABAPENTIN 300 MG: 300 CAPSULE ORAL at 22:04

## 2017-05-18 RX ADMIN — HYDROCODONE BITARTRATE AND ACETAMINOPHEN 1 TABLET: 5; 325 TABLET ORAL at 22:03

## 2017-05-18 RX ADMIN — LEVOTHYROXINE SODIUM 75 MCG: 75 TABLET ORAL at 16:25

## 2017-05-19 LAB
ALBUMIN SERPL-MCNC: 3.2 G/DL (ref 3.5–5.2)
ANION GAP SERPL CALCULATED.3IONS-SCNC: 16.6 MMOL/L
BUN BLD-MCNC: 26 MG/DL (ref 8–23)
BUN/CREAT SERPL: 3.9 (ref 7–25)
CA-I BLD-MCNC: 4.5 MG/DL (ref 4.6–5.4)
CA-I BLD-MCNC: 4.6 MG/DL (ref 4.6–5.4)
CA-I BLD-MCNC: 5 MG/DL (ref 4.6–5.4)
CA-I SERPL ISE-MCNC: 1.13 MMOL/L (ref 1.1–1.35)
CA-I SERPL ISE-MCNC: 1.15 MMOL/L (ref 1.1–1.35)
CA-I SERPL ISE-MCNC: 1.25 MMOL/L (ref 1.1–1.35)
CALCIUM SPEC-SCNC: 7.9 MG/DL (ref 8.6–10.5)
CHLORIDE SERPL-SCNC: 96 MMOL/L (ref 98–107)
CO2 SERPL-SCNC: 20.4 MMOL/L (ref 22–29)
CREAT BLD-MCNC: 6.72 MG/DL (ref 0.57–1)
CYTO UR: NORMAL
GFR SERPL CREATININE-BSD FRML MDRD: 8 ML/MIN/1.73
GLUCOSE BLD-MCNC: 86 MG/DL (ref 65–99)
LAB AP CASE REPORT: NORMAL
Lab: NORMAL
Lab: NORMAL
MAGNESIUM SERPL-MCNC: 1.9 MG/DL (ref 1.6–2.4)
PATH REPORT.FINAL DX SPEC: NORMAL
PATH REPORT.GROSS SPEC: NORMAL
PHOSPHATE SERPL-MCNC: 5.7 MG/DL (ref 2.5–4.5)
PHOSPHATE SERPL-MCNC: 5.7 MG/DL (ref 2.5–4.5)
POTASSIUM BLD-SCNC: 4.2 MMOL/L (ref 3.5–5.2)
SODIUM BLD-SCNC: 133 MMOL/L (ref 136–145)

## 2017-05-19 PROCEDURE — 99024 POSTOP FOLLOW-UP VISIT: CPT | Performed by: SURGERY

## 2017-05-19 PROCEDURE — 84100 ASSAY OF PHOSPHORUS: CPT | Performed by: INTERNAL MEDICINE

## 2017-05-19 PROCEDURE — 82330 ASSAY OF CALCIUM: CPT | Performed by: INTERNAL MEDICINE

## 2017-05-19 PROCEDURE — 80069 RENAL FUNCTION PANEL: CPT | Performed by: INTERNAL MEDICINE

## 2017-05-19 PROCEDURE — G0378 HOSPITAL OBSERVATION PER HR: HCPCS

## 2017-05-19 PROCEDURE — 83735 ASSAY OF MAGNESIUM: CPT | Performed by: INTERNAL MEDICINE

## 2017-05-19 RX ORDER — LEVOTHYROXINE SODIUM 0.07 MG/1
75 TABLET ORAL DAILY
Qty: 90 TABLET | Refills: 0 | Status: SHIPPED | OUTPATIENT
Start: 2017-05-19 | End: 2022-01-14

## 2017-05-19 RX ORDER — ONDANSETRON 4 MG/1
4 TABLET, FILM COATED ORAL EVERY 6 HOURS PRN
Qty: 20 TABLET | Refills: 0 | Status: SHIPPED | OUTPATIENT
Start: 2017-05-19 | End: 2022-07-22

## 2017-05-19 RX ADMIN — GABAPENTIN 300 MG: 300 CAPSULE ORAL at 20:00

## 2017-05-19 RX ADMIN — SEVELAMER CARBONATE 2400 MG: 800 TABLET, FILM COATED ORAL at 17:09

## 2017-05-19 RX ADMIN — HYDROCODONE BITARTATE AND ACETAMINOPHEN 1 TABLET: 5; 325 TABLET ORAL at 06:21

## 2017-05-19 RX ADMIN — SEVELAMER CARBONATE 2400 MG: 800 TABLET, FILM COATED ORAL at 12:01

## 2017-05-19 RX ADMIN — GABAPENTIN 300 MG: 300 CAPSULE ORAL at 17:09

## 2017-05-19 RX ADMIN — AMLODIPINE BESYLATE 10 MG: 10 TABLET ORAL at 08:39

## 2017-05-19 RX ADMIN — METOPROLOL TARTRATE 100 MG: 100 TABLET ORAL at 08:39

## 2017-05-19 RX ADMIN — CALCITRIOL 0.25 MCG: 0.25 CAPSULE, LIQUID FILLED ORAL at 08:39

## 2017-05-19 RX ADMIN — SEVELAMER CARBONATE 2400 MG: 800 TABLET, FILM COATED ORAL at 08:39

## 2017-05-19 RX ADMIN — GABAPENTIN 300 MG: 300 CAPSULE ORAL at 08:39

## 2017-05-19 RX ADMIN — METOPROLOL TARTRATE 100 MG: 100 TABLET ORAL at 20:00

## 2017-05-19 RX ADMIN — LEVOTHYROXINE SODIUM 75 MCG: 75 TABLET ORAL at 06:26

## 2017-05-19 RX ADMIN — CALCIUM CARBONATE 1250 MG: 1250 TABLET ORAL at 08:38

## 2017-05-19 RX ADMIN — HYDROCODONE BITARTRATE AND ACETAMINOPHEN 1 TABLET: 5; 325 TABLET ORAL at 20:00

## 2017-05-19 RX ADMIN — CALCITRIOL 0.25 MCG: 0.25 CAPSULE, LIQUID FILLED ORAL at 20:00

## 2017-05-19 RX ADMIN — HYDROCODONE BITARTRATE AND ACETAMINOPHEN 1 TABLET: 5; 325 TABLET ORAL at 06:26

## 2017-05-19 RX ADMIN — CALCIUM CARBONATE 1250 MG: 1250 TABLET ORAL at 19:59

## 2017-05-20 VITALS
WEIGHT: 92.59 LBS | HEART RATE: 93 BPM | BODY MASS INDEX: 15.81 KG/M2 | OXYGEN SATURATION: 91 % | DIASTOLIC BLOOD PRESSURE: 80 MMHG | HEIGHT: 64 IN | SYSTOLIC BLOOD PRESSURE: 169 MMHG | RESPIRATION RATE: 20 BRPM | TEMPERATURE: 97.9 F

## 2017-05-20 PROBLEM — E04.0 GOITER DIFFUSE: Status: RESOLVED | Noted: 2017-05-18 | Resolved: 2017-05-20

## 2017-05-20 LAB
ALBUMIN SERPL-MCNC: 3.1 G/DL (ref 3.5–5.2)
ANION GAP SERPL CALCULATED.3IONS-SCNC: 16.6 MMOL/L
BUN BLD-MCNC: 35 MG/DL (ref 8–23)
BUN/CREAT SERPL: 4 (ref 7–25)
CALCIUM SPEC-SCNC: 8.6 MG/DL (ref 8.6–10.5)
CHLORIDE SERPL-SCNC: 93 MMOL/L (ref 98–107)
CO2 SERPL-SCNC: 21.4 MMOL/L (ref 22–29)
CREAT BLD-MCNC: 8.68 MG/DL (ref 0.57–1)
GFR SERPL CREATININE-BSD FRML MDRD: 6 ML/MIN/1.73
GLUCOSE BLD-MCNC: 90 MG/DL (ref 65–99)
PHOSPHATE SERPL-MCNC: 4.2 MG/DL (ref 2.5–4.5)
POTASSIUM BLD-SCNC: 4.7 MMOL/L (ref 3.5–5.2)
SODIUM BLD-SCNC: 131 MMOL/L (ref 136–145)

## 2017-05-20 PROCEDURE — G0257 UNSCHED DIALYSIS ESRD PT HOS: HCPCS

## 2017-05-20 PROCEDURE — 99024 POSTOP FOLLOW-UP VISIT: CPT | Performed by: SURGERY

## 2017-05-20 PROCEDURE — G0378 HOSPITAL OBSERVATION PER HR: HCPCS

## 2017-05-20 PROCEDURE — 80069 RENAL FUNCTION PANEL: CPT | Performed by: INTERNAL MEDICINE

## 2017-05-20 RX ORDER — CALCITRIOL 0.25 UG/1
0.25 CAPSULE, LIQUID FILLED ORAL EVERY 12 HOURS SCHEDULED
Qty: 60 CAPSULE | Refills: 0 | Status: SHIPPED | OUTPATIENT
Start: 2017-05-20 | End: 2017-06-05

## 2017-05-20 RX ORDER — CALCIUM ACETATE 667 MG/1
2001 TABLET ORAL
Qty: 60 TABLET | Refills: 2 | Status: SHIPPED | OUTPATIENT
Start: 2017-05-20 | End: 2023-02-03

## 2017-05-20 RX ORDER — IBUPROFEN 200 MG
1250 CAPSULE ORAL 2 TIMES DAILY
Qty: 60 TABLET | Refills: 2 | Status: SHIPPED | OUTPATIENT
Start: 2017-05-20 | End: 2022-04-08

## 2017-05-20 RX ADMIN — SEVELAMER CARBONATE 2400 MG: 800 TABLET, FILM COATED ORAL at 08:05

## 2017-05-20 RX ADMIN — AMLODIPINE BESYLATE 10 MG: 10 TABLET ORAL at 14:29

## 2017-05-20 RX ADMIN — CALCITRIOL 0.25 MCG: 0.25 CAPSULE, LIQUID FILLED ORAL at 14:29

## 2017-05-20 RX ADMIN — SEVELAMER CARBONATE 2400 MG: 800 TABLET, FILM COATED ORAL at 14:28

## 2017-05-20 RX ADMIN — HYDROCODONE BITARTRATE AND ACETAMINOPHEN 1 TABLET: 5; 325 TABLET ORAL at 14:51

## 2017-05-20 RX ADMIN — LEVOTHYROXINE SODIUM 75 MCG: 75 TABLET ORAL at 06:40

## 2017-05-20 RX ADMIN — CALCIUM CARBONATE 1250 MG: 1250 TABLET ORAL at 06:40

## 2017-05-20 RX ADMIN — METOPROLOL TARTRATE 100 MG: 100 TABLET ORAL at 14:28

## 2017-05-20 RX ADMIN — GABAPENTIN 300 MG: 300 CAPSULE ORAL at 14:29

## 2017-05-22 ENCOUNTER — TELEPHONE (OUTPATIENT)
Dept: SURGERY | Facility: CLINIC | Age: 62
End: 2017-05-22

## 2017-06-05 ENCOUNTER — OFFICE VISIT (OUTPATIENT)
Dept: SURGERY | Facility: CLINIC | Age: 62
End: 2017-06-05

## 2017-06-05 DIAGNOSIS — E04.0 GOITER DIFFUSE: Primary | ICD-10-CM

## 2017-06-05 DIAGNOSIS — E21.3 HYPERPARATHYROIDISM (HCC): ICD-10-CM

## 2017-06-05 PROCEDURE — 99024 POSTOP FOLLOW-UP VISIT: CPT | Performed by: SURGERY

## 2017-06-05 RX ORDER — CALCIUM CARBONATE 500(1250)
TABLET ORAL
Refills: 2 | COMMUNITY
Start: 2017-05-22 | End: 2022-04-08

## 2017-06-08 NOTE — PROGRESS NOTES
SURGERY: [facility]  Post op Visit  Princess Marie  06/08/17    Princess is here postop from a total thyroidectomy, 4 gland parathyroid resection, with half gland autotransplantation into the right arm about 3 weeks ago.  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  The gland was free at the margins and she had one benign lymph node.  She had a multinodular goiter as well.    She needs no additional surgery for her thyroid cancer.  In the context of her renal failure, I think it has no clinical relevance.    Happily her presenting symptom which was difficulty swallowing is somewhat improved.    Unfortunately she has not gone to get her labs anytime recently that she knows about.  I asked her to make sure that she would get a hold of Dr. Randhawa's office and make sure that those labs are checked, as it appears she is not taking her calcitriol, unless they're giving it to her at the dialysis center.    I'll see her when necessary     Lauren James MD

## 2018-07-23 ENCOUNTER — OFFICE (OUTPATIENT)
Dept: URBAN - METROPOLITAN AREA CLINIC 75 | Facility: CLINIC | Age: 63
End: 2018-07-23
Payer: COMMERCIAL

## 2018-07-23 VITALS
HEIGHT: 64 IN | SYSTOLIC BLOOD PRESSURE: 128 MMHG | RESPIRATION RATE: 16 BRPM | WEIGHT: 130 LBS | HEART RATE: 86 BPM | DIASTOLIC BLOOD PRESSURE: 82 MMHG

## 2018-07-23 DIAGNOSIS — K21.9 GASTRO-ESOPHAGEAL REFLUX DISEASE WITHOUT ESOPHAGITIS: ICD-10-CM

## 2018-07-23 DIAGNOSIS — Z86.010 PERSONAL HISTORY OF COLONIC POLYPS: ICD-10-CM

## 2018-07-23 DIAGNOSIS — D64.9 ANEMIA, UNSPECIFIED: ICD-10-CM

## 2018-07-23 DIAGNOSIS — R13.10 DYSPHAGIA, UNSPECIFIED: ICD-10-CM

## 2018-07-23 DIAGNOSIS — R10.9 UNSPECIFIED ABDOMINAL PAIN: ICD-10-CM

## 2018-07-23 DIAGNOSIS — R11.0 NAUSEA: ICD-10-CM

## 2018-07-23 PROCEDURE — 99204 OFFICE O/P NEW MOD 45 MIN: CPT | Performed by: INTERNAL MEDICINE

## 2018-07-23 RX ORDER — PANTOPRAZOLE SODIUM 40 MG/1
40 TABLET, DELAYED RELEASE ORAL
Qty: 30 | Refills: 11 | Status: ACTIVE
Start: 2018-07-23

## 2018-07-24 ENCOUNTER — TRANSCRIBE ORDERS (OUTPATIENT)
Dept: ADMINISTRATIVE | Facility: HOSPITAL | Age: 63
End: 2018-07-24

## 2018-07-24 DIAGNOSIS — K21.9 GERD WITHOUT ESOPHAGITIS: Primary | ICD-10-CM

## 2018-08-03 ENCOUNTER — HOSPITAL ENCOUNTER (OUTPATIENT)
Dept: NUCLEAR MEDICINE | Facility: HOSPITAL | Age: 63
Discharge: HOME | End: 2018-08-03

## 2018-08-03 DIAGNOSIS — K21.9 GERD WITHOUT ESOPHAGITIS: ICD-10-CM

## 2018-08-03 PROCEDURE — 0 TECHNETIUM SULFUR COLLOID: Performed by: INTERNAL MEDICINE

## 2018-08-03 PROCEDURE — 78264 GASTRIC EMPTYING IMG STUDY: CPT

## 2018-08-03 PROCEDURE — A9541 TC99M SULFUR COLLOID: HCPCS | Performed by: INTERNAL MEDICINE

## 2018-08-03 RX ADMIN — TECHNETIUM TC 99M SULFUR COLLOID 1 DOSE: KIT at 07:20

## 2018-08-06 ENCOUNTER — HOSPITAL ENCOUNTER (OUTPATIENT)
Dept: GENERAL RADIOLOGY | Facility: HOSPITAL | Age: 63
Discharge: HOME | End: 2018-08-06
Admitting: INTERNAL MEDICINE

## 2018-08-06 DIAGNOSIS — K21.9 GERD WITHOUT ESOPHAGITIS: ICD-10-CM

## 2018-08-06 PROCEDURE — 74250 X-RAY XM SM INT 1CNTRST STD: CPT

## 2020-01-16 ENCOUNTER — TRANSCRIBE ORDERS (OUTPATIENT)
Dept: ADMINISTRATIVE | Facility: HOSPITAL | Age: 65
End: 2020-01-16

## 2020-01-16 DIAGNOSIS — R91.1 PULMONARY NODULE: Primary | ICD-10-CM

## 2020-01-31 ENCOUNTER — HOSPITAL ENCOUNTER (OUTPATIENT)
Dept: CT IMAGING | Facility: HOSPITAL | Age: 65
Discharge: HOME | End: 2020-01-31
Admitting: INTERNAL MEDICINE

## 2020-01-31 DIAGNOSIS — R91.1 PULMONARY NODULE: ICD-10-CM

## 2020-01-31 PROCEDURE — 71250 CT THORAX DX C-: CPT

## 2020-02-14 NOTE — PROGRESS NOTES
Chief Complaint   Patient presents with   • Hyperparathyroidism        HPI    Patient is a 61 y.o. female who is referred by Joaquin Randhawa MD for secondary hyperparathyroidism. Patient has a history of chronic kidney disease.  She receives dialysis on Tuesdays Thursdays and Saturdays.  She has a left upper arm graft. She is currently taking sensipar.    Upon reviewing records her most recent total calcium was 9.2 on 1/19/2017.  Her intact PTH was 1500.    She denies any abdominal pain, kidney stones, usual fatigue, bone pain, abdominal pain, or decreased concentration. She reports having extreme soreness in her neck.  She is hoarse.  She reports I feel like my throat is closing off.  This is been present for several months. She denies any unintentional weight loss.    She smokes cigarettes family use the nicotine patch on occasion when she is trying to quit.     She has not had any imaging studies.    She denies any esophageal cancer lung cancer in her family.     Past Medical History   Diagnosis Date   • Anemia    • Anxiety    • Asthma    • Coronary artery disease    • Hyperparathyroidism    • Hypertension    • Kidney disease      Past Surgical History   Procedure Laterality Date   • Hysterectomy N/A 1989   • Breast biopsy      Left upper arm graft       Family History   Problem Relation Age of Onset   • Heart disease Mother    • Cancer Father      Social History     Social History   • Marital status: Single     Spouse name: N/A   • Number of children: N/A   • Years of education: N/A     Occupational History   • Not on file.     Social History Main Topics   • Smoking status: Current Every Day Smoker     Packs/day: 0.50     Years: 45.00   • Smokeless tobacco: Not on file   • Alcohol use No   • Drug use: Not on file   • Sexual activity: Not on file     Other Topics Concern   • Not on file     Social History Narrative   • No narrative on file         Current Outpatient Prescriptions:   •  albuterol (PROVENTIL  Krish Kenny is a 5year old female who presents with for chief complaint of fever, chills, nasal congestion, coryza, rhinorrhea, sore throat, cough, headache, myalgias, fatigue, malaise, tiredness along with nausea, dizziness and lightheadedness.  Onset of "HFA;VENTOLIN HFA) 108 (90 BASE) MCG/ACT inhaler, Inhale 1-2 puffs Every 6 (Six) Hours., Disp: , Rfl:   •  amLODIPine (NORVASC) 10 MG tablet, Take 10 mg by mouth Daily., Disp: , Rfl:   •  cinacalcet (SENSIPAR) 60 MG tablet, Take 60 mg by mouth Daily., Disp: , Rfl:   •  hydrALAZINE (APRESOLINE) 100 MG tablet, Take 100 mg by mouth 3 (Three) Times a Day., Disp: , Rfl:   •  lidocaine-prilocaine (EMLA) 2.5-2.5 % cream, APPLY SMALL AMOUNT TO ACCESS SITE 1 TO 2 HOURS BEFORE DIALYSIS. COVER WITH OCCLUSIVE DRESSING (SARAN WRAP)., Disp: , Rfl: 11  •  metoprolol tartrate (LOPRESSOR) 100 MG tablet, Take 100 mg by mouth 2 (Two) Times a Day., Disp: , Rfl:   •  nicotine (NICODERM CQ) 14 MG/24HR patch, Place 1 patch on the skin Daily., Disp: , Rfl:   •  pravastatin (PRAVACHOL) 10 MG tablet, Take 10 mg by mouth Daily., Disp: , Rfl:   •  Sucroferric Oxyhydroxide (VELPHORO) 500 MG chewable tablet, Chew 2 tablets 3 (Three) Times a Day., Disp: , Rfl:     No Known Allergies    Preventative Medicine  Colonoscopy: none to date    Review of Systems   Constitutional: Negative for chills, diaphoresis, fatigue, fever and unexpected weight change.   HENT: Positive for trouble swallowing and voice change.    Respiratory: Negative for cough, shortness of breath and wheezing.    Cardiovascular: Negative for chest pain, palpitations and leg swelling.   Gastrointestinal: Negative for abdominal pain.   Musculoskeletal: Negative for arthralgias and back pain.   Neurological: Negative for dizziness and weakness.   Hematological: Does not bruise/bleed easily.   All other systems reviewed and are negative.      Vitals:    03/08/17 1422   Pulse: 95   SpO2: 98%   Weight: 105 lb (47.6 kg)   Height: 64\" (162.6 cm)     Body mass index is 18.02 kg/(m^2).    Physical Exam   Constitutional: She is oriented to person, place, and time. She appears well-developed.   thin   HENT:   Head: Normocephalic and atraumatic.   Eyes: Conjunctivae are normal. No scleral " adenopathy  Lungs: clear to auscultation bilaterally. No chest wall retractions. No respiratory distress.  No tachypnea noted  Heart: S1, S2 normal, no murmur, click, rub or gallop, regular rate and rhythm  Abdomen: soft, non-tender; bowel sounds normal; no icterus.   Neck: Normal range of motion. No tracheal deviation present.   No distinct mass detected   Cardiovascular: Normal rate, regular rhythm and intact distal pulses.    No bruits   Pulmonary/Chest: Effort normal and breath sounds normal.   Abdominal: Soft. Bowel sounds are normal. She exhibits no distension and no mass. There is no tenderness. There is no rebound and no guarding. No hernia.   Musculoskeletal: Normal range of motion.   Neurological: She is alert and oriented to person, place, and time.   Skin: Skin is warm and dry.   Psychiatric: She has a normal mood and affect.     ASSESSMENT:  · Secondary hyperparathyroidism most recent calcium 9.1, intact PTH 1500 in 1/2017 on Sensipar   · Pain of submandibular gland   · Dysphagia   · CKD (chronic kidney disease with left upper graft on dialysis Tuesdays, Thursdays, and Saturdays)  · Tobacco use   · Hypertension   · Asthma     PLAN:  · CT of neck.  I like to evaluate for parathyroids, as well as look to see if there is something that could be obviously causing her hoarseness.  · Thyroid US.  Likewise help us evaluate parathyroids.  I think is unlikely that she had thyroiditis is tender thyroid region and thus not particularly looking for that  · UGI.  This will help us evaluate for swallowing difficulties.  · OV after studies  · Encouraged smoking cessation and informed patient she will need to stop smoking at least 1 week prior to surgery    This chart is documented by STEPHANIA Garay acting as a scribe for Dr. Lauren James MD.    We did discuss the plan for a 4 gland resection with half gland autotransplantation briefly.  A pamphlet was given.  We discussed the risk of recurrent laryngeal nerve injury, bleeding and infection.    If we cannot find anything to explain the reason for her submandibular gland tenderness, we may need to refer her to an ENT.    I personally performed the history, physical exam and medical decision making for this  visit.    Lauren James MD

## 2021-05-07 ENCOUNTER — HOSPITAL ENCOUNTER (OUTPATIENT)
Dept: GENERAL RADIOLOGY | Facility: HOSPITAL | Age: 66
Discharge: HOME | End: 2021-05-07
Admitting: INTERNAL MEDICINE

## 2021-05-07 DIAGNOSIS — R05.8 PRODUCTIVE COUGH: ICD-10-CM

## 2021-05-07 PROCEDURE — 71046 X-RAY EXAM CHEST 2 VIEWS: CPT

## 2021-12-15 ENCOUNTER — OFFICE VISIT (OUTPATIENT)
Dept: ENDOCRINOLOGY | Age: 66
End: 2021-12-15

## 2021-12-15 VITALS
WEIGHT: 116.2 LBS | BODY MASS INDEX: 19.84 KG/M2 | HEIGHT: 64 IN | OXYGEN SATURATION: 100 % | RESPIRATION RATE: 20 BRPM | HEART RATE: 67 BPM

## 2021-12-15 DIAGNOSIS — E89.2 POST-SURGICAL HYPOPARATHYROIDISM (HCC): ICD-10-CM

## 2021-12-15 DIAGNOSIS — C73 THYROID CANCER (HCC): ICD-10-CM

## 2021-12-15 DIAGNOSIS — E89.0 POSTSURGICAL HYPOTHYROIDISM: Primary | ICD-10-CM

## 2021-12-15 DIAGNOSIS — E55.9 VITAMIN D DEFICIENCY: ICD-10-CM

## 2021-12-15 PROBLEM — J44.9 COPD (CHRONIC OBSTRUCTIVE PULMONARY DISEASE) (HCC): Status: ACTIVE | Noted: 2021-12-15

## 2021-12-15 PROBLEM — I10 ESSENTIAL HYPERTENSION: Status: ACTIVE | Noted: 2021-12-15

## 2021-12-15 PROCEDURE — 99204 OFFICE O/P NEW MOD 45 MIN: CPT | Performed by: INTERNAL MEDICINE

## 2021-12-15 NOTE — PROGRESS NOTES
Chief Complaint  Hypothyroidism    Subjective          Princess Marie presents to [facility] ENDOCRINOLOGY  History of Present Illness    Ms. Marie is a 66-year-old -American female who presents for evaluation and treatment recommendations for postsurgical hypothyroidism.    Ms. Marie has a past medical history of hypertension and end-stage renal disease.  She receives hemodialysis 3 times a week.  She was noted to have hypercalcemia and was diagnosed with tertiary hyperparathyroidism.  She had 3-1/2 gland parathyroidectomy with 1/2 gland autotransplant in her right arm.    She also has a history of a multinodular goiter.  She had an FNA thyroid nodule biopsy of her right and left thyroid nodules.  Cytology was consistent with follicular epithelium, colloid and histiocytes i.e. [place] II.    At the time of parathyroid surgery, the patient underwent a total thyroidectomy 05/17/2017 by Dr Billie James.     5/17/2017:  · Parathyroid resection X 4, with 1/2 gland autotransplantation into right arm  · Total thyroidectomy      Surgical pathology 5/17/2017:  Final Diagnosis   1. LEFT INFERIOR PARATHYROID:               ENLARGED HYPERCELLULAR PARATHYROID GLAND.     2. PORTION OF LEFT SUPERIOR PARATHYROID:               HYPERCELLULAR PARATHYROID TISSUE.     3. PORTION RIGHT SUPERIOR PARATHYROID:               HYPERCELLULAR PARATHYROID TISSUE.     4. RIGHT INFERIOR PARA THYROID:               HYPERCELLULAR PARATHYROID.               FRAGMENT OF BENIGN THYROID TISSUE.     5. 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               ABSENCE OF EXTRACAPSULAR EXTENSION OF TUMOR.               GLAND MARGIN FREE OF TUMOR.               ONE BENIGN LYMPH NODE.                  MULTINODULAR ADENOMATOUS GOITER.        Procedure: Total thyroidectomy.  Lymph node sampling: Single included lymph node.  Tumor laterality: Right.  Tumor focality: Unifocal.  Tumor size: 2 mm.  Histologic  type: Papillary carcinoma, usual type.  Margins: Margins uninvolved by invasive carcinoma.  Angioinvasion: Not identified.  Lymphatic invasion: Not identified.  Extrathyroidal extension: Not identified.  Pathologic staging:     Primary tumor: pT1.     Regional lymph nodes: pN0.     Number examined: 1.     Number involved: 0.  Additional pathologic findings: Multinodular adenomatous goiter.    The patient's primary care physician has managed her postsurgical hyper hypothyroidism.  She has not been followed up for her history of stage I thyroid cancer.    She has had wide fluctuations in her TSH levels with frequent dose adjustments by her primary care physician.  Her primary care physician took her off LT4 three months ago and checked her labs.  He subsequently restarted her on levothyroxine at 25 mcg daily and graduallt increased the dose to 75 mcg daily.  She now has been on the levothyroxine 75 mcg daily since about a month.  Her thyroid function studies remain out of range for at least 3 months according to the patient, however records indicate that it has been much longer.  See below.    She has been experiencing fatigue, weight gain (5-6 lb over 6 mo), constipation, cold intolerance. She denies depression, excessive sleepiness, difficulty concentrating, skin/hair/nail changes. She denies anxiety, tremor, heat intolerance, polyphagia, weight loss, muscle weakness.    There is no family history of thyroid dysfunction.    Patient denies blurred vision, double vision, pain or swelling of the eyes.    Patient uses tobacco use.    Patient has occasional dysphagia. She had esophageal dilatation.  She has some odynophagia. She has a dry cough and hoarseness of voice.     There is no history of ionizing radiation to the head or neck.  There is no family history of thyroid cancer.    Patient is not on biotin, over the counter thyroid medications, Li, Amiodarone   No history of recent IV contrast dye.    Most recent  "thyroid function studies:  12/10/2021 TSH 7.650 IU/mL  11/8/2021 TSH > 100 IU/mL, free t4 0.42 ng/dL  09/29/2021 TSH 37.71 IU/mL, free t4 0.79 ng/dL  08/11/2021 TSH 50.15 IU/mL, free t4   07/02/2021 TSH 0.008 IU/mL, free t4   10/10/2020 TSH <0.15 IU/mL, free t4 3.04 ng/dL  08/28/2020 TSH 0.007 IU/mL, free t4       Thyroid imaging studies from before her thyroidectomy:   Thyroid ultrasound 3/17/2017  Multinodular thyroid as described with largest nodule  measuring on the order of 2.3 cm in greatest dimension.    No neck US done since hr thyroidectomy.      Objective   Vital Signs:   Pulse 67   Resp 20   Ht 162.6 cm (64\")   Wt 52.7 kg (116 lb 3.2 oz)   SpO2 100%   BMI 19.95 kg/m²     Physical Exam  Constitutional:       General: She is not in acute distress.     Appearance: She is not ill-appearing, toxic-appearing or diaphoretic.      Comments: Elderly -American female with lean body habitus.  She is in no obvious distress.   HENT:      Head: Normocephalic and atraumatic.      Nose: Nose normal.      Mouth/Throat:      Mouth: Mucous membranes are moist.      Pharynx: No oropharyngeal exudate or posterior oropharyngeal erythema.   Eyes:      Extraocular Movements: Extraocular movements intact.      Conjunctiva/sclera: Conjunctivae normal.      Pupils: Pupils are equal, round, and reactive to light.      Comments: No lid lag or lid retraction.   Neck:      Vascular: No carotid bruit.      Comments: Thyroidectomy scar appreciated. No palpable thyroid tissue. No palpable cervical lymph nodes.   Cardiovascular:      Rate and Rhythm: Normal rate and regular rhythm.      Pulses: Normal pulses.      Heart sounds: Normal heart sounds. No murmur heard.  No friction rub. No gallop.    Pulmonary:      Effort: Pulmonary effort is normal. No respiratory distress.      Breath sounds: Normal breath sounds. No stridor. No wheezing, rhonchi or rales.   Abdominal:      General: Bowel sounds are normal. There is no " distension.      Palpations: Abdomen is soft. There is no mass.      Tenderness: There is no abdominal tenderness. There is no rebound.   Musculoskeletal:         General: No swelling, tenderness, deformity or signs of injury. Normal range of motion.      Cervical back: Full passive range of motion without pain, normal range of motion and neck supple. No rigidity or tenderness.      Right lower leg: No edema.      Left lower leg: No edema.   Lymphadenopathy:      Cervical: No cervical adenopathy.   Skin:     General: Skin is warm and dry.      Coloration: Skin is not jaundiced or pale.      Findings: No bruising, erythema, lesion or rash.   Neurological:      General: No focal deficit present.      Mental Status: She is alert and oriented to person, place, and time.      Cranial Nerves: No cranial nerve deficit.      Sensory: No sensory deficit.      Motor: No weakness.      Coordination: Coordination normal.      Gait: Gait normal.      Deep Tendon Reflexes: Reflexes normal.      Comments: No tremors on outstretched fingers.   Psychiatric:         Mood and Affect: Mood normal.         Behavior: Behavior normal.         Thought Content: Thought content normal.        Result Review :         Most recent thyroid function studies:  12/10/2021 TSH 7.650 IU/mL  11/8/2021 TSH > 100 IU/mL, free t4 0.42 ng/dL  09/29/2021 TSH 37.71 IU/mL, free t4 0.79 ng/dL  08/11/2021 TSH 50.15 IU/mL, free t4   07/02/2021 TSH 0.008 IU/mL, free t4   10/10/2020 TSH <0.15 IU/mL, free t4 3.04 ng/dL  08/28/2020 TSH 0.007 IU/mL, free t4       Thyroid imaging studies from before her thyroidectomy:   Thyroid ultrasound 3/17/2017  Multinodular thyroid as described with largest nodule  measuring on the order of 2.3 cm in greatest dimension.    No neck US done since hr thyroidectomy.     Assessment and Plan    Diagnoses and all orders for this visit:    1. Postsurgical hypothyroidism (Primary)  -     TSH; Future  -     T4, Free; Future    2. Thyroid  cancer (HCC)  -     TSH; Future  -     T4, Free; Future  -     Anti-Thyroglobulin Antibody; Future  -     US Thyroid  -     Thyroglobulin; Future    3. Post-surgical hypoparathyroidism (HCC)  -     Comprehensive Metabolic Panel; Future  -     Phosphorus; Future  -     PTH, Intact; Future    4. Vitamin D deficiency  -     Comprehensive Metabolic Panel; Future  -     Phosphorus; Future  -     PTH, Intact; Future  -     Vitamin D 25 Hydroxy; Future        1. Please continue your levothyroxine at 75 mcg daily as directed ie on an empty stomach. Wait to eat/drink > 30 minutes after ingestion. Space out any calcium of iron supplement >4 hrs from levothyroxine dose.    2. TSH goal 1-3 uIU/mL given history of stage I thyroid cancer and age group.    3. We will order thyroid cancer surveillance work up ie TFT, Tg/TgAb and TUS.    4. We will reassess the parathyroid status. She is s/p 3.35 parathyroidectomy with 1/2 gland reimplantation in right arm.    5. Dysphagia: follow up with PCP    I spent 40 minutes caring for Princess on this date of service. This time includes time spent by me in the following activities:preparing for the visit, reviewing tests, obtaining and/or reviewing a separately obtained history, performing a medically appropriate examination and/or evaluation , counseling and educating the patient/family/caregiver, ordering medications, tests, or procedures, referring and communicating with other health care professionals , documenting information in the medical record, independently interpreting results and communicating that information with the patient/family/caregiver and care coordination  Follow Up   Return in about 4 weeks (around 1/12/2022).  Patient was given instructions and counseling regarding her condition or for health maintenance advice. Please see specific information pulled into the AVS if appropriate.

## 2022-01-13 ENCOUNTER — HOSPITAL ENCOUNTER (OUTPATIENT)
Dept: ULTRASOUND IMAGING | Facility: HOSPITAL | Age: 67
Discharge: HOME | End: 2022-01-13
Admitting: INTERNAL MEDICINE

## 2022-01-13 PROCEDURE — 76536 US EXAM OF HEAD AND NECK: CPT

## 2022-01-14 ENCOUNTER — OFFICE VISIT (OUTPATIENT)
Dept: ENDOCRINOLOGY | Age: 67
End: 2022-01-14

## 2022-01-14 VITALS
HEIGHT: 63 IN | SYSTOLIC BLOOD PRESSURE: 127 MMHG | DIASTOLIC BLOOD PRESSURE: 84 MMHG | BODY MASS INDEX: 20.91 KG/M2 | HEART RATE: 70 BPM | WEIGHT: 118 LBS | OXYGEN SATURATION: 97 %

## 2022-01-14 DIAGNOSIS — E89.0 POSTSURGICAL HYPOTHYROIDISM: ICD-10-CM

## 2022-01-14 DIAGNOSIS — E55.9 VITAMIN D DEFICIENCY: ICD-10-CM

## 2022-01-14 DIAGNOSIS — C73 THYROID CANCER: Primary | ICD-10-CM

## 2022-01-14 DIAGNOSIS — E89.2 POST-SURGICAL HYPOPARATHYROIDISM: ICD-10-CM

## 2022-01-14 DIAGNOSIS — E21.2 HYPERPARATHYROIDISM, TERTIARY: ICD-10-CM

## 2022-01-14 PROCEDURE — 99214 OFFICE O/P EST MOD 30 MIN: CPT | Performed by: INTERNAL MEDICINE

## 2022-01-14 RX ORDER — LEVOTHYROXINE SODIUM 0.1 MG/1
100 TABLET ORAL DAILY
Qty: 90 TABLET | Refills: 0 | Status: SHIPPED | OUTPATIENT
Start: 2022-01-14 | End: 2022-04-08

## 2022-01-14 NOTE — PROGRESS NOTES
Chief Complaint  Hypothyroidism, Thyroid Cancer, and Vitamin D Deficiency    Subjective        The patient is s/p thyroid cancer, postsurgical hypothyroidism and  She is s/p 3.5 parathyroidectomy with 1/2 gland reimplantation in right arm.  She has end-stage renal disease and is on hemodialysis.    Interval history is negative for any new symptoms, ER visits or hospitalizations.      Review from January 3, 2022 shows:  Calcium 9.1 mg/dL, albumin 4.5 mg/dL,  pg/mL with a 25-hydroxy vitamin D level of 15.7 ng/dL.  She did her thyroid US yesterday. We are awaiting results.     Thyroid function studies show a TSH of 12 uIU/mL, free T4 1.5 ng/dL and an undetectable thyroglobulin with an undetectable thyroglobulin antibody.    Rest of the labs are as in the chart.      Princess Marie presents to [facility] ENDOCRINOLOGY  History of Present Illness December 15, 2021;    Ms. Marie is a 66-year-old -American female who presents for evaluation and treatment recommendations for postsurgical hypothyroidism.    Ms. Marie has a past medical history of hypertension and end-stage renal disease.  She receives hemodialysis 3 times a week.  She was noted to have hypercalcemia and was diagnosed with tertiary hyperparathyroidism.  She had 3-1/2 gland parathyroidectomy with 1/2 gland autotransplant in her right arm.    She also has a history of a multinodular goiter.  She had an FNA thyroid nodule biopsy of her right and left thyroid nodules.  Cytology was consistent with follicular epithelium, colloid and histiocytes i.e. [place] II.    At the time of parathyroid surgery, the patient underwent a total thyroidectomy 05/17/2017 by Dr Billie James.     5/17/2017:  · Parathyroid resection X 4, with 1/2 gland autotransplantation into right arm  · Total thyroidectomy      Surgical pathology 5/17/2017:  Final Diagnosis   1. LEFT INFERIOR PARATHYROID:               ENLARGED HYPERCELLULAR PARATHYROID  GLAND.     2. PORTION OF LEFT SUPERIOR PARATHYROID:               HYPERCELLULAR PARATHYROID TISSUE.     3. PORTION RIGHT SUPERIOR PARATHYROID:               HYPERCELLULAR PARATHYROID TISSUE.     4. RIGHT INFERIOR PARA THYROID:               HYPERCELLULAR PARATHYROID.               FRAGMENT OF BENIGN THYROID TISSUE.     5. 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               ABSENCE OF EXTRACAPSULAR EXTENSION OF TUMOR.               GLAND MARGIN FREE OF TUMOR.               ONE BENIGN LYMPH NODE.                  MULTINODULAR ADENOMATOUS GOITER.        Procedure: Total thyroidectomy.  Lymph node sampling: Single included lymph node.  Tumor laterality: Right.  Tumor focality: Unifocal.  Tumor size: 2 mm.  Histologic type: Papillary carcinoma, usual type.  Margins: Margins uninvolved by invasive carcinoma.  Angioinvasion: Not identified.  Lymphatic invasion: Not identified.  Extrathyroidal extension: Not identified.  Pathologic staging:     Primary tumor: pT1.     Regional lymph nodes: pN0.     Number examined: 1.     Number involved: 0.  Additional pathologic findings: Multinodular adenomatous goiter.    The patient's primary care physician has managed her postsurgical hyper hypothyroidism.  She has not been followed up for her history of stage I thyroid cancer.    She has had wide fluctuations in her TSH levels with frequent dose adjustments by her primary care physician.  Her primary care physician took her off LT4 three months ago and checked her labs.  He subsequently restarted her on levothyroxine at 25 mcg daily and graduallt increased the dose to 75 mcg daily.  She now has been on the levothyroxine 75 mcg daily since about a month.  Her thyroid function studies remain out of range for at least 3 months according to the patient, however records indicate that it has been much longer.  See below.    She has been experiencing fatigue, weight gain (5-6 lb over 6 mo), constipation, cold  "intolerance. She denies depression, excessive sleepiness, difficulty concentrating, skin/hair/nail changes. She denies anxiety, tremor, heat intolerance, polyphagia, weight loss, muscle weakness.    There is no family history of thyroid dysfunction.    Patient denies blurred vision, double vision, pain or swelling of the eyes.    Patient uses tobacco use.    Patient has occasional dysphagia. She had esophageal dilatation.  She has some odynophagia. She has a dry cough and hoarseness of voice.     There is no history of ionizing radiation to the head or neck.  There is no family history of thyroid cancer.    Patient is not on biotin, over the counter thyroid medications, Li, Amiodarone   No history of recent IV contrast dye.    Most recent thyroid function studies:  12/10/2021 TSH 7.650 IU/mL  11/8/2021 TSH > 100 IU/mL, free t4 0.42 ng/dL  09/29/2021 TSH 37.71 IU/mL, free t4 0.79 ng/dL  08/11/2021 TSH 50.15 IU/mL, free t4   07/02/2021 TSH 0.008 IU/mL, free t4   10/10/2020 TSH <0.15 IU/mL, free t4 3.04 ng/dL  08/28/2020 TSH 0.007 IU/mL, free t4     Review from January 3, 2022 shows:  Calcium 9.1 mg/dL, albumin 4.5 mg/dL,  pg/mL with a 25-hydroxy vitamin D level of 15.7 ng/dL.    Thyroid function studies show a TSH of 12 uIU/mL, free T4 1.5 ng/dL and an undetectable thyroglobulin with an undetectable thyroglobulin antibody.    Thyroid imaging studies from before her thyroidectomy:   Thyroid ultrasound 3/17/2017  Multinodular thyroid as described with largest nodule  measuring on the order of 2.3 cm in greatest dimension.    She did her thyroid US yesterday. We are awaiting results.       Objective   Vital Signs:   /84   Pulse 70   Ht 160 cm (63\")   Wt 53.5 kg (118 lb)   SpO2 97%   BMI 20.90 kg/m²     Physical Exam  Constitutional:       General: She is not in acute distress.     Appearance: She is not ill-appearing, toxic-appearing or diaphoretic.      Comments: Elderly -American female with " lean body habitus.  She is in no obvious distress.   HENT:      Head: Normocephalic and atraumatic.      Nose: Nose normal.      Mouth/Throat:      Mouth: Mucous membranes are moist.      Pharynx: No oropharyngeal exudate or posterior oropharyngeal erythema.   Eyes:      Extraocular Movements: Extraocular movements intact.      Conjunctiva/sclera: Conjunctivae normal.      Pupils: Pupils are equal, round, and reactive to light.      Comments: No lid lag or lid retraction.   Neck:      Vascular: No carotid bruit.      Comments: Thyroidectomy scar appreciated. No palpable thyroid tissue. No palpable cervical lymph nodes.   Cardiovascular:      Rate and Rhythm: Normal rate and regular rhythm.      Pulses: Normal pulses.      Heart sounds: Normal heart sounds. No murmur heard.  No friction rub. No gallop.    Pulmonary:      Effort: Pulmonary effort is normal. No respiratory distress.      Breath sounds: Normal breath sounds. No stridor. No wheezing, rhonchi or rales.   Abdominal:      General: Bowel sounds are normal. There is no distension.      Palpations: Abdomen is soft. There is no mass.      Tenderness: There is no abdominal tenderness. There is no rebound.   Musculoskeletal:         General: No swelling, tenderness, deformity or signs of injury. Normal range of motion.      Cervical back: Full passive range of motion without pain, normal range of motion and neck supple. No rigidity or tenderness.      Right lower leg: No edema.      Left lower leg: No edema.   Lymphadenopathy:      Cervical: No cervical adenopathy.   Skin:     General: Skin is warm and dry.      Coloration: Skin is not jaundiced or pale.      Findings: No bruising, erythema, lesion or rash.   Neurological:      General: No focal deficit present.      Mental Status: She is alert and oriented to person, place, and time.      Cranial Nerves: No cranial nerve deficit.      Sensory: No sensory deficit.      Motor: No weakness.      Coordination:  Coordination normal.      Gait: Gait normal.      Deep Tendon Reflexes: Reflexes normal.      Comments: No tremors on outstretched fingers.   Psychiatric:         Mood and Affect: Mood normal.         Behavior: Behavior normal.         Thought Content: Thought content normal.        Result Review :         Most recent thyroid function studies:  12/10/2021 TSH 7.650 IU/mL  11/8/2021 TSH > 100 IU/mL, free t4 0.42 ng/dL  09/29/2021 TSH 37.71 IU/mL, free t4 0.79 ng/dL  08/11/2021 TSH 50.15 IU/mL, free t4   07/02/2021 TSH 0.008 IU/mL, free t4   10/10/2020 TSH <0.15 IU/mL, free t4 3.04 ng/dL  08/28/2020 TSH 0.007 IU/mL, free t4       Thyroid imaging studies from before her thyroidectomy:   Thyroid ultrasound 3/17/2017  Multinodular thyroid as described with largest nodule  measuring on the order of 2.3 cm in greatest dimension.    No neck US done since hr thyroidectomy.     Assessment and Plan    Diagnoses and all orders for this visit:    1. Thyroid cancer (HCC) (Primary)  -     levothyroxine (SYNTHROID, LEVOTHROID) 100 MCG tablet; Take 1 tablet by mouth Daily for 90 days.  Dispense: 90 tablet; Refill: 0    2. Postsurgical hypothyroidism  -     levothyroxine (SYNTHROID, LEVOTHROID) 100 MCG tablet; Take 1 tablet by mouth Daily for 90 days.  Dispense: 90 tablet; Refill: 0  -     TSH; Future  -     T4, Free; Future    3. Post-surgical hypoparathyroidism (HCC)    4. Vitamin D deficiency  -     cholecalciferol (VITAMIN D3) 250 MCG (44065 UT) capsule; Take one capsule on Saturday and take one capsule on Wednesday.  Dispense: 24 capsule; Refill: 3  -     Vitamin D 25 Hydroxy; Future    5. Hyperparathyroidism, tertiary (HCC)  -     PTH, Intact; Future  -     Vitamin D 25 Hydroxy; Future  -     Comprehensive Metabolic Panel; Future  -     Phosphorus; Future  -     Calcium, Ionized; Future        1. Please increase levothyroxine to 100 mcg daily as directed ie on an empty stomach. Wait to eat/drink > 30 minutes after ingestion.  Space out any calcium of iron supplement >4 hrs from levothyroxine dose.    2. TSH goal 1-3 uIU/mL given history of stage I thyroid cancer and age group.    3. Reviewed thyroid cancer surveillance work up ie TFT, Tg/TgAb. Will wait for the neck US report.     4.  We will start Vit D 50,000 IU two times a week and receck labs in 3 months. She is s/p 3.5 parathyroidectomy with 1/2 gland reimplantation in right arm.    5. Dysphagia: follow up with PCP    I spent 40 minutes caring for Princess on this date of service. This time includes time spent by me in the following activities:preparing for the visit, reviewing tests, obtaining and/or reviewing a separately obtained history, performing a medically appropriate examination and/or evaluation , counseling and educating the patient/family/caregiver, ordering medications, tests, or procedures, referring and communicating with other health care professionals , documenting information in the medical record, independently interpreting results and communicating that information with the patient/family/caregiver and care coordination  Follow Up   Return in about 3 months (around 4/14/2022).  Patient was given instructions and counseling regarding her condition or for health maintenance advice. Please see specific information pulled into the AVS if appropriate.

## 2022-04-01 ENCOUNTER — LAB (OUTPATIENT)
Dept: ENDOCRINOLOGY | Age: 67
End: 2022-04-01

## 2022-04-01 DIAGNOSIS — E21.2 HYPERPARATHYROIDISM, TERTIARY: ICD-10-CM

## 2022-04-01 DIAGNOSIS — E55.9 VITAMIN D DEFICIENCY: ICD-10-CM

## 2022-04-01 DIAGNOSIS — E89.0 POSTSURGICAL HYPOTHYROIDISM: ICD-10-CM

## 2022-04-02 LAB
25(OH)D3+25(OH)D2 SERPL-MCNC: 51.3 NG/ML (ref 30–100)
ALBUMIN SERPL-MCNC: 4.6 G/DL (ref 3.8–4.8)
ALBUMIN/GLOB SERPL: 1.5 {RATIO} (ref 1.2–2.2)
ALP SERPL-CCNC: 96 IU/L (ref 44–121)
ALT SERPL-CCNC: 5 IU/L (ref 0–32)
AST SERPL-CCNC: 13 IU/L (ref 0–40)
BILIRUB SERPL-MCNC: 0.4 MG/DL (ref 0–1.2)
BUN SERPL-MCNC: 26 MG/DL (ref 8–27)
BUN/CREAT SERPL: 3 (ref 12–28)
CA-I SERPL ISE-MCNC: 4.3 MG/DL (ref 4.5–5.6)
CALCIUM SERPL-MCNC: 8.5 MG/DL (ref 8.7–10.3)
CHLORIDE SERPL-SCNC: 92 MMOL/L (ref 96–106)
CO2 SERPL-SCNC: 29 MMOL/L (ref 20–29)
CREAT SERPL-MCNC: 8.2 MG/DL (ref 0.57–1)
EGFRCR SERPLBLD CKD-EPI 2021: 5 ML/MIN/1.73
GLOBULIN SER CALC-MCNC: 3 G/DL (ref 1.5–4.5)
GLUCOSE SERPL-MCNC: 94 MG/DL (ref 65–99)
PHOSPHATE SERPL-MCNC: 5.7 MG/DL (ref 3–4.3)
POTASSIUM SERPL-SCNC: 4.3 MMOL/L (ref 3.5–5.2)
PROT SERPL-MCNC: 7.6 G/DL (ref 6–8.5)
PTH-INTACT SERPL-MCNC: 470 PG/ML (ref 15–65)
REPORT: NORMAL
SODIUM SERPL-SCNC: 140 MMOL/L (ref 134–144)
T4 FREE SERPL-MCNC: 1.49 NG/DL (ref 0.82–1.77)
TSH SERPL DL<=0.005 MIU/L-ACNC: 3.82 UIU/ML (ref 0.45–4.5)

## 2022-04-08 ENCOUNTER — OFFICE VISIT (OUTPATIENT)
Dept: ENDOCRINOLOGY | Age: 67
End: 2022-04-08

## 2022-04-08 VITALS
SYSTOLIC BLOOD PRESSURE: 131 MMHG | RESPIRATION RATE: 20 BRPM | BODY MASS INDEX: 21.33 KG/M2 | HEIGHT: 63 IN | OXYGEN SATURATION: 98 % | HEART RATE: 75 BPM | WEIGHT: 120.4 LBS | DIASTOLIC BLOOD PRESSURE: 81 MMHG

## 2022-04-08 DIAGNOSIS — E55.9 VITAMIN D DEFICIENCY: ICD-10-CM

## 2022-04-08 DIAGNOSIS — N18.6 CHRONIC KIDNEY DISEASE WITH END STAGE RENAL FAILURE ON DIALYSIS: ICD-10-CM

## 2022-04-08 DIAGNOSIS — E89.2 POST-SURGICAL HYPOPARATHYROIDISM: ICD-10-CM

## 2022-04-08 DIAGNOSIS — E89.0 POSTSURGICAL HYPOTHYROIDISM: Primary | ICD-10-CM

## 2022-04-08 DIAGNOSIS — C73 THYROID CANCER: ICD-10-CM

## 2022-04-08 DIAGNOSIS — E21.2 TERTIARY HYPERPARATHYROIDISM: ICD-10-CM

## 2022-04-08 DIAGNOSIS — Z99.2 CHRONIC KIDNEY DISEASE WITH END STAGE RENAL FAILURE ON DIALYSIS: ICD-10-CM

## 2022-04-08 DIAGNOSIS — E78.5 DYSLIPIDEMIA: ICD-10-CM

## 2022-04-08 PROCEDURE — 99215 OFFICE O/P EST HI 40 MIN: CPT | Performed by: INTERNAL MEDICINE

## 2022-04-08 RX ORDER — PRAVASTATIN SODIUM 10 MG
10 TABLET ORAL NIGHTLY
Qty: 90 TABLET | Refills: 3 | Status: SHIPPED | OUTPATIENT
Start: 2022-04-08

## 2022-04-08 RX ORDER — LEVOTHYROXINE SODIUM 112 MCG
TABLET ORAL
Qty: 90 TABLET | Refills: 2 | Status: SHIPPED | OUTPATIENT
Start: 2022-04-08 | End: 2022-10-27 | Stop reason: SDUPTHER

## 2022-04-08 NOTE — PROGRESS NOTES
Chief Complaint  Thyroid Cancer and postsurgical hypothyroidism    Subjective        The patient is s/p thyroid cancer, postsurgical hypothyroidism and  She is s/p 3.5 parathyroidectomy with 1/2 gland reimplantation in right arm.  She has end-stage renal disease and is on hemodialysis.      4/8/2021 follow up:  Interval history is negative for any new symptoms, ER visits or hospitalizations.  Her dysphagia has resolved.  Patient still smokes and is not ready to quit.    She is on LT4 at 100 mcg daily.  She is on Vit D3 Vit D 10,000 IU twice a week, in stead of 50,000 IU twice a week.  Patent is not on Pravastatin, which is on her list.    Labs and imaging reviewed and discussed with patient.       Latest Reference Range & Units 01/03/22 12:50 04/01/22 10:37   Glucose 65 - 99 mg/dL 85 94   Calcium 8.7 - 10.3 mg/dL 9.1 8.5 (L)   EGFR African Am >59 mL/min/1.73 5 (L) [1]    Albumin 3.8 - 4.8 g/dL 4.5 4.6   Phosphorus 3.0 - 4.3 mg/dL 3.6 5.7 (H)   PTH Intact (Serial Monitor) 15 - 65 pg/mL 985 (H) 470 (H)   TSH Baseline 0.450 - 4.500 uIU/mL 12.100 (H) 3.820   Free T4 0.82 - 1.77 ng/dL 1.51 1.49   Thyroglobulin ng/mL <2.0 [3]    Thyroglobulin Ab 0.0 - 0.9 IU/mL <1.0 [4]    25 Hydroxy, Vitamin D 30.0 - 100.0 ng/mL 15.7 (L) [5] 51.3 [6]        Latest Reference Range & Units 11/08/21 08:29 12/10/21 09:33 01/03/22 12:50 04/01/22 10:37   TSH Baseline 0.450 - 4.500 uIU/mL >100.000 (H) (E) 17.650 (H) (E) 12.100 (H) 3.820   Free T4 0.82 - 1.77 ng/dL 0.42 (L) (E)  1.51 1.49   Thyroglobulin ng/mL   <2.0 [1]    Thyroglobulin Ab 0.0 - 0.9 IU/mL   <1.0 [2]        THYROID US 1/13/2022:  COMPARISON:  03/17/2017  FINDINGS: The thyroidectomy site is typical in appearance, no mass nor  adenopathy nor abnormal fluid demonstrated at this location.  CONCLUSION:  1. Thyroidectomy site is typical in appearance.  2. No cervical adenopathy.      Princess Marie presents to [facility] ENDOCRINOLOGY  History of Present Illness  December 15, 2021;    Ms. Marie is a 66-year-old -American female who presents for evaluation and treatment recommendations for postsurgical hypothyroidism.    Ms. Marie has a past medical history of hypertension and end-stage renal disease.  She receives hemodialysis 3 times a week.  She was noted to have hypercalcemia and was diagnosed with tertiary hyperparathyroidism.  She had 3-1/2 gland parathyroidectomy with 1/2 gland autotransplant in her right arm.    She also has a history of a multinodular goiter.  She had an FNA thyroid nodule biopsy of her right and left thyroid nodules.  Cytology was consistent with follicular epithelium, colloid and histiocytes i.e. [place] II.    At the time of parathyroid surgery, the patient underwent a total thyroidectomy 05/17/2017 by Dr Billie James.     5/17/2017:  · Parathyroid resection X 4, with 1/2 gland autotransplantation into right arm  · Total thyroidectomy      Surgical pathology 5/17/2017:  Final Diagnosis   1. LEFT INFERIOR PARATHYROID:               ENLARGED HYPERCELLULAR PARATHYROID GLAND.     2. PORTION OF LEFT SUPERIOR PARATHYROID:               HYPERCELLULAR PARATHYROID TISSUE.     3. PORTION RIGHT SUPERIOR PARATHYROID:               HYPERCELLULAR PARATHYROID TISSUE.     4. RIGHT INFERIOR PARA THYROID:               HYPERCELLULAR PARATHYROID.               FRAGMENT OF BENIGN THYROID TISSUE.     5. 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               ABSENCE OF EXTRACAPSULAR EXTENSION OF TUMOR.               GLAND MARGIN FREE OF TUMOR.               ONE BENIGN LYMPH NODE.                  MULTINODULAR ADENOMATOUS GOITER.        Procedure: Total thyroidectomy.  Lymph node sampling: Single included lymph node.  Tumor laterality: Right.  Tumor focality: Unifocal.  Tumor size: 2 mm.  Histologic type: Papillary carcinoma, usual type.  Margins: Margins uninvolved by invasive carcinoma.  Angioinvasion: Not  identified.  Lymphatic invasion: Not identified.  Extrathyroidal extension: Not identified.  Pathologic staging:     Primary tumor: pT1.     Regional lymph nodes: pN0.     Number examined: 1.     Number involved: 0.  Additional pathologic findings: Multinodular adenomatous goiter.    The patient's primary care physician has managed her postsurgical hyper hypothyroidism.  She has not been followed up for her history of stage I thyroid cancer.    She has had wide fluctuations in her TSH levels with frequent dose adjustments by her primary care physician.  Her primary care physician took her off LT4 three months ago and checked her labs.  He subsequently restarted her on levothyroxine at 25 mcg daily and graduallt increased the dose to 75 mcg daily.  She now has been on the levothyroxine 75 mcg daily since about a month.  Her thyroid function studies remain out of range for at least 3 months according to the patient, however records indicate that it has been much longer.  See below.    She has been experiencing fatigue, weight gain (5-6 lb over 6 mo), constipation, cold intolerance. She denies depression, excessive sleepiness, difficulty concentrating, skin/hair/nail changes. She denies anxiety, tremor, heat intolerance, polyphagia, weight loss, muscle weakness.    There is no family history of thyroid dysfunction.    Patient denies blurred vision, double vision, pain or swelling of the eyes.    Patient uses tobacco use.    Patient has occasional dysphagia. She had esophageal dilatation.  She has some odynophagia. She has a dry cough and hoarseness of voice.     There is no history of ionizing radiation to the head or neck.  There is no family history of thyroid cancer.    Patient is not on biotin, over the counter thyroid medications, Li, Amiodarone   No history of recent IV contrast dye.    Most recent thyroid function studies:  12/10/2021 TSH 7.650 IU/mL  11/8/2021 TSH > 100 IU/mL, free t4 0.42 ng/dL  09/29/2021 TSH  "37.71 IU/mL, free t4 0.79 ng/dL  08/11/2021 TSH 50.15 IU/mL, free t4   07/02/2021 TSH 0.008 IU/mL, free t4   10/10/2020 TSH <0.15 IU/mL, free t4 3.04 ng/dL  08/28/2020 TSH 0.007 IU/mL, free t4     Review from January 3, 2022 shows:  Calcium 9.1 mg/dL, albumin 4.5 mg/dL,  pg/mL with a 25-hydroxy vitamin D level of 15.7 ng/dL.    Thyroid function studies show a TSH of 12 uIU/mL, free T4 1.5 ng/dL and an undetectable thyroglobulin with an undetectable thyroglobulin antibody.    Thyroid imaging studies from before her thyroidectomy:   Thyroid ultrasound 3/17/2017  Multinodular thyroid as described with largest nodule  measuring on the order of 2.3 cm in greatest dimension.    She did her thyroid US yesterday. We are awaiting results.       Objective   Vital Signs:   /81   Pulse 75   Resp 20   Ht 160 cm (63\")   Wt 54.6 kg (120 lb 6.4 oz)   SpO2 98%   BMI 21.33 kg/m²     Physical Exam  Constitutional:       General: She is not in acute distress.     Appearance: She is not ill-appearing, toxic-appearing or diaphoretic.      Comments: Elderly -American female with lean body habitus.  She is in no obvious distress.   HENT:      Head: Normocephalic and atraumatic.      Nose: Nose normal.      Mouth/Throat:      Mouth: Mucous membranes are moist.      Pharynx: No oropharyngeal exudate or posterior oropharyngeal erythema.   Eyes:      Extraocular Movements: Extraocular movements intact.      Conjunctiva/sclera: Conjunctivae normal.      Pupils: Pupils are equal, round, and reactive to light.      Comments: No lid lag or lid retraction.   Neck:      Vascular: No carotid bruit.      Comments: Thyroidectomy scar appreciated. No palpable thyroid tissue. No palpable cervical lymph nodes.   Cardiovascular:      Rate and Rhythm: Normal rate and regular rhythm.      Pulses: Normal pulses.      Heart sounds: Normal heart sounds. No murmur heard.    No friction rub. No gallop.   Pulmonary:      Effort: Pulmonary " effort is normal. No respiratory distress.      Breath sounds: Normal breath sounds. No stridor. No wheezing, rhonchi or rales.   Abdominal:      General: Bowel sounds are normal. There is no distension.      Palpations: Abdomen is soft. There is no mass.      Tenderness: There is no abdominal tenderness. There is no rebound.   Musculoskeletal:         General: No swelling, tenderness, deformity or signs of injury. Normal range of motion.      Cervical back: Full passive range of motion without pain, normal range of motion and neck supple. No rigidity or tenderness.      Right lower leg: No edema.      Left lower leg: No edema.   Lymphadenopathy:      Cervical: No cervical adenopathy.   Skin:     General: Skin is warm and dry.      Coloration: Skin is not jaundiced or pale.      Findings: No bruising, erythema, lesion or rash.   Neurological:      General: No focal deficit present.      Mental Status: She is alert and oriented to person, place, and time.      Cranial Nerves: No cranial nerve deficit.      Sensory: No sensory deficit.      Motor: No weakness.      Coordination: Coordination normal.      Gait: Gait normal.      Deep Tendon Reflexes: Reflexes normal.      Comments: No tremors on outstretched fingers.   Psychiatric:         Mood and Affect: Mood normal.         Behavior: Behavior normal.         Thought Content: Thought content normal.        Result Review :         Most recent thyroid function studies:  12/10/2021 TSH 7.650 IU/mL  11/8/2021 TSH > 100 IU/mL, free t4 0.42 ng/dL  09/29/2021 TSH 37.71 IU/mL, free t4 0.79 ng/dL  08/11/2021 TSH 50.15 IU/mL, free t4   07/02/2021 TSH 0.008 IU/mL, free t4   10/10/2020 TSH <0.15 IU/mL, free t4 3.04 ng/dL  08/28/2020 TSH 0.007 IU/mL, free t4       Thyroid imaging studies from before her thyroidectomy:   Thyroid ultrasound 3/17/2017  Multinodular thyroid as described with largest nodule  measuring on the order of 2.3 cm in greatest dimension.    No neck US done  since hr thyroidectomy.     Assessment and Plan    Diagnoses and all orders for this visit:    1. Postsurgical hypothyroidism (Primary)  -     Synthroid 112 MCG tablet; By mouth take 1 tab daily in AM as directed on empty stomach with water only.  Brand Medically Necessary  Dispense: 90 tablet; Refill: 2  -     TSH; Future  -     T4, Free; Future    2. Thyroid cancer (HCC)  -     Synthroid 112 MCG tablet; By mouth take 1 tab daily in AM as directed on empty stomach with water only.  Brand Medically Necessary  Dispense: 90 tablet; Refill: 2  -     TSH; Future  -     T4, Free; Future    3. Tertiary hyperparathyroidism (HCC)  -     Comprehensive Metabolic Panel; Future  -     PTH, Intact; Future  -     Vitamin D 25 Hydroxy; Future  -     Vitamin D 1,25 Dihydroxy; Future  -     Phosphorus; Future  -     Calcium, Ionized; Future    4. Vitamin D deficiency  -     Vitamin D 25 Hydroxy; Future  -     Vitamin D 1,25 Dihydroxy; Future  -     cholecalciferol (VITAMIN D3) 250 MCG (64504 UT) capsule; Take one capsule on Tuesday, one capsule on Thursday and one on Saturday.  Dispense: 36 capsule; Refill: 3    5. Post-surgical hypoparathyroidism (HCC)  -     Comprehensive Metabolic Panel; Future  -     PTH, Intact; Future  -     Vitamin D 25 Hydroxy; Future  -     Vitamin D 1,25 Dihydroxy; Future  -     Phosphorus; Future  -     Calcium, Ionized; Future    6. Chronic kidney disease with end stage renal failure on dialysis (HCC)  -     Comprehensive Metabolic Panel; Future    7. Dyslipidemia  -     pravastatin (PRAVACHOL) 10 MG tablet; Take 1 tablet by mouth Every Night.  Dispense: 90 tablet; Refill: 3  -     Lipid Panel; Future        1. Please increase levothyroxine to 110 mcg daily as directed ie on an empty stomach. Wait to eat/drink > 30 minutes after ingestion. Space out any calcium of iron supplement >4 hrs from levothyroxine dose.    2. TSH goal 1-3 uIU/mL given history of stage I thyroid cancer and age group.  Reviewed  thyroid cancer surveillance work up. No evidence of thyroid cancer recurrence. Will check tumor markers and neck US yearsly or once every 2 years.     3.  We will increase Vit D 10,000 IU three times a week and receck labs in 2-3 months. She is s/p 3.5 parathyroidectomy with 1/2 gland reimplantation in right arm. If PTH remains persistently elevated with D/W patient's nephrologist Dr Santana ([facility] location, [phone]). She states she is receiving activated Vit D with HD.    Please do labs in 2-3 months and follow up in 3 months.    I spent 40 minutes caring for Princess on this date of service. This time includes time spent by me in the following activities:preparing for the visit, reviewing tests, obtaining and/or reviewing a separately obtained history, performing a medically appropriate examination and/or evaluation , counseling and educating the patient/family/caregiver, ordering medications, tests, or procedures, referring and communicating with other health care professionals , documenting information in the medical record, independently interpreting results and communicating that information with the patient/family/caregiver and care coordination  Follow Up   Return in about 3 months (around 7/8/2022).  Patient was given instructions and counseling regarding her condition or for health maintenance advice. Please see specific information pulled into the AVS if appropriate.

## 2022-07-22 ENCOUNTER — OFFICE VISIT (OUTPATIENT)
Dept: ENDOCRINOLOGY | Age: 67
End: 2022-07-22

## 2022-07-22 VITALS
WEIGHT: 116.2 LBS | HEART RATE: 76 BPM | HEIGHT: 63 IN | SYSTOLIC BLOOD PRESSURE: 122 MMHG | DIASTOLIC BLOOD PRESSURE: 70 MMHG | BODY MASS INDEX: 20.59 KG/M2 | TEMPERATURE: 98.4 F | OXYGEN SATURATION: 99 %

## 2022-07-22 DIAGNOSIS — Z72.0 TOBACCO USE: ICD-10-CM

## 2022-07-22 DIAGNOSIS — E89.0 POSTSURGICAL HYPOTHYROIDISM: ICD-10-CM

## 2022-07-22 DIAGNOSIS — N18.6 CHRONIC KIDNEY DISEASE WITH END STAGE RENAL FAILURE ON DIALYSIS: ICD-10-CM

## 2022-07-22 DIAGNOSIS — C73 THYROID CANCER: Primary | ICD-10-CM

## 2022-07-22 DIAGNOSIS — Z99.2 CHRONIC KIDNEY DISEASE WITH END STAGE RENAL FAILURE ON DIALYSIS: ICD-10-CM

## 2022-07-22 DIAGNOSIS — R73.03 PREDIABETES: ICD-10-CM

## 2022-07-22 DIAGNOSIS — Z13.1 SCREENING FOR DIABETES MELLITUS: ICD-10-CM

## 2022-07-22 DIAGNOSIS — E21.2 TERTIARY HYPERPARATHYROIDISM: ICD-10-CM

## 2022-07-22 DIAGNOSIS — E55.9 VITAMIN D DEFICIENCY: ICD-10-CM

## 2022-07-22 DIAGNOSIS — M81.0 AGE-RELATED OSTEOPOROSIS WITHOUT CURRENT PATHOLOGICAL FRACTURE: ICD-10-CM

## 2022-07-22 PROCEDURE — 99215 OFFICE O/P EST HI 40 MIN: CPT | Performed by: INTERNAL MEDICINE

## 2022-07-22 RX ORDER — AZITHROMYCIN 500 MG/1
500 TABLET, FILM COATED ORAL DAILY
COMMUNITY
Start: 2022-06-09

## 2022-07-22 RX ORDER — PANTOPRAZOLE SODIUM 40 MG/1
40 TABLET, DELAYED RELEASE ORAL 2 TIMES DAILY
COMMUNITY
Start: 2022-05-04 | End: 2023-02-03

## 2022-07-22 RX ORDER — CARVEDILOL 25 MG/1
25 TABLET ORAL 2 TIMES DAILY WITH MEALS
COMMUNITY
Start: 2022-07-07

## 2022-07-22 NOTE — PROGRESS NOTES
Chief Complaint  Hypothyroidism    Subjective        Patient coms to the office for follow up accompanied by her grand daughter Kevin.    Interval history negative for any new symptoms, ER visits or hospitalizations.     The patient is s/p thyroid cancer, postsurgical hypothyroidism and  She is s/p 3.5 parathyroidectomy with 1/2 gland reimplantation in right arm.  She has end-stage renal disease and is on hemodialysis.    7/22/2022 follow up:  She is on levothyroxine to 112 mcg daily. TSH goal 1-3 uIU/mL    Reviewed thyroid cancer surveillance work up. No evidence of thyroid cancer recurrence. Will check tumor markers and neck US yearly in January or once every 2 years.      She is on a higher dose of Vit D 10,000 IU at three times a week. She is s/p 3.5 parathyroidectomy with 1/2 gland reimplantation in right arm. PTH improved since Vit D dose increase. She follows up with her nephrologist Dr Santana ([facility] location, [phone]). She states she is receiving activated Vit D with HD.    She continues to smoke 10 cigarettes per day.     Latest Reference Range & Units 07/08/22 07:51   Glucose 65 - 99 mg/dL 81   Calcium 8.7 - 10.3 mg/dL 8.6 (L)   Ionized Calcium 4.5 - 5.6 mg/dL 4.3 (L)   EGFR Result >59 mL/min/1.73 5 (L)   Phosphorus 3.0 - 4.3 mg/dL 5.7 (H)   PTH Intact (Serial Monitor) 15 - 65 pg/mL 156 (H)   TSH Baseline 0.450 - 4.500 uIU/mL 1.650   Free T4 0.82 - 1.77 ng/dL 1.71   Total Cholesterol 100 - 199 mg/dL 174   HDL Cholesterol >39 mg/dL 44   LDL Cholesterol  0 - 99 mg/dL 106 (H)   Triglycerides 0 - 149 mg/dL 137   VLDL Cholesterol Cricket 5 - 40 mg/dL 24   1,25-Dihydroxy, Vitamin D 24.8 - 81.5 pg/mL 31.7         4/8/2021 follow up:  Interval history is negative for any new symptoms, ER visits or hospitalizations.  Her dysphagia has resolved.  Patient still smokes and is not ready to quit.    She is on LT4 at 100 mcg daily.  She is on Vit D3 Vit D 10,000 IU twice a week, in stead of 50,000 IU  twice a week.  Patent is not on Pravastatin, which is on her list.    Labs and imaging reviewed and discussed with patient.       Latest Reference Range & Units 01/03/22 12:50 04/01/22 10:37   Glucose 65 - 99 mg/dL 85 94   Calcium 8.7 - 10.3 mg/dL 9.1 8.5 (L)   EGFR African Am >59 mL/min/1.73 5 (L) [1]    Albumin 3.8 - 4.8 g/dL 4.5 4.6   Phosphorus 3.0 - 4.3 mg/dL 3.6 5.7 (H)   PTH Intact (Serial Monitor) 15 - 65 pg/mL 985 (H) 470 (H)   TSH Baseline 0.450 - 4.500 uIU/mL 12.100 (H) 3.820   Free T4 0.82 - 1.77 ng/dL 1.51 1.49   Thyroglobulin ng/mL <2.0 [3]    Thyroglobulin Ab 0.0 - 0.9 IU/mL <1.0 [4]    25 Hydroxy, Vitamin D 30.0 - 100.0 ng/mL 15.7 (L) [5] 51.3 [6]        Latest Reference Range & Units 11/08/21 08:29 12/10/21 09:33 01/03/22 12:50 04/01/22 10:37   TSH Baseline 0.450 - 4.500 uIU/mL >100.000 (H) (E) 17.650 (H) (E) 12.100 (H) 3.820   Free T4 0.82 - 1.77 ng/dL 0.42 (L) (E)  1.51 1.49   Thyroglobulin ng/mL   <2.0 [1]    Thyroglobulin Ab 0.0 - 0.9 IU/mL   <1.0 [2]        THYROID US 1/13/2022:  COMPARISON:  03/17/2017  FINDINGS: The thyroidectomy site is typical in appearance, no mass nor  adenopathy nor abnormal fluid demonstrated at this location.  CONCLUSION:  1. Thyroidectomy site is typical in appearance.  2. No cervical adenopathy.      Princess Marie presents to [facility] ENDOCRINOLOGY  Thyroid Cancer     December 15, 2021;    Ms. Marie is a 66-year-old -American female who presents for evaluation and treatment recommendations for postsurgical hypothyroidism.    Ms. Marie has a past medical history of hypertension and end-stage renal disease.  She receives hemodialysis 3 times a week.  She was noted to have hypercalcemia and was diagnosed with tertiary hyperparathyroidism.  She had 3-1/2 gland parathyroidectomy with 1/2 gland autotransplant in her right arm.    She also has a history of a multinodular goiter.  She had an FNA thyroid nodule biopsy of her right and left thyroid  nodules.  Cytology was consistent with follicular epithelium, colloid and histiocytes i.e. [place] II.    At the time of parathyroid surgery, the patient underwent a total thyroidectomy 05/17/2017 by Dr Billie James.     5/17/2017:  · Parathyroid resection X 4, with 1/2 gland autotransplantation into right arm  · Total thyroidectomy      Surgical pathology 5/17/2017:  Final Diagnosis   1. LEFT INFERIOR PARATHYROID:               ENLARGED HYPERCELLULAR PARATHYROID GLAND.     2. PORTION OF LEFT SUPERIOR PARATHYROID:               HYPERCELLULAR PARATHYROID TISSUE.     3. PORTION RIGHT SUPERIOR PARATHYROID:               HYPERCELLULAR PARATHYROID TISSUE.     4. RIGHT INFERIOR PARA THYROID:               HYPERCELLULAR PARATHYROID.               FRAGMENT OF BENIGN THYROID TISSUE.     5. 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               ABSENCE OF EXTRACAPSULAR EXTENSION OF TUMOR.               GLAND MARGIN FREE OF TUMOR.               ONE BENIGN LYMPH NODE.                  MULTINODULAR ADENOMATOUS GOITER.        Procedure: Total thyroidectomy.  Lymph node sampling: Single included lymph node.  Tumor laterality: Right.  Tumor focality: Unifocal.  Tumor size: 2 mm.  Histologic type: Papillary carcinoma, usual type.  Margins: Margins uninvolved by invasive carcinoma.  Angioinvasion: Not identified.  Lymphatic invasion: Not identified.  Extrathyroidal extension: Not identified.  Pathologic staging:     Primary tumor: pT1.     Regional lymph nodes: pN0.     Number examined: 1.     Number involved: 0.  Additional pathologic findings: Multinodular adenomatous goiter.    The patient's primary care physician has managed her postsurgical hyper hypothyroidism.  She has not been followed up for her history of stage I thyroid cancer.    She has had wide fluctuations in her TSH levels with frequent dose adjustments by her primary care physician.  Her primary care physician took her off LT4 three  months ago and checked her labs.  He subsequently restarted her on levothyroxine at 25 mcg daily and graduallt increased the dose to 75 mcg daily.  She now has been on the levothyroxine 75 mcg daily since about a month.  Her thyroid function studies remain out of range for at least 3 months according to the patient, however records indicate that it has been much longer.  See below.    She has been experiencing fatigue, weight gain (5-6 lb over 6 mo), constipation, cold intolerance. She denies depression, excessive sleepiness, difficulty concentrating, skin/hair/nail changes. She denies anxiety, tremor, heat intolerance, polyphagia, weight loss, muscle weakness.    There is no family history of thyroid dysfunction.    Patient denies blurred vision, double vision, pain or swelling of the eyes.    Patient uses tobacco use.    Patient has occasional dysphagia. She had esophageal dilatation.  She has some odynophagia. She has a dry cough and hoarseness of voice.     There is no history of ionizing radiation to the head or neck.  There is no family history of thyroid cancer.    Patient is not on biotin, over the counter thyroid medications, Li, Amiodarone   No history of recent IV contrast dye.    Most recent thyroid function studies:  12/10/2021 TSH 7.650 IU/mL  11/8/2021 TSH > 100 IU/mL, free t4 0.42 ng/dL  09/29/2021 TSH 37.71 IU/mL, free t4 0.79 ng/dL  08/11/2021 TSH 50.15 IU/mL, free t4   07/02/2021 TSH 0.008 IU/mL, free t4   10/10/2020 TSH <0.15 IU/mL, free t4 3.04 ng/dL  08/28/2020 TSH 0.007 IU/mL, free t4     Review from January 3, 2022 shows:  Calcium 9.1 mg/dL, albumin 4.5 mg/dL,  pg/mL with a 25-hydroxy vitamin D level of 15.7 ng/dL.    Thyroid function studies show a TSH of 12 uIU/mL, free T4 1.5 ng/dL and an undetectable thyroglobulin with an undetectable thyroglobulin antibody.    Thyroid imaging studies from before her thyroidectomy:   Thyroid ultrasound 3/17/2017  Multinodular thyroid as described  "with largest nodule  measuring on the order of 2.3 cm in greatest dimension.    She did her thyroid US yesterday. We are awaiting results.       Objective   Vital Signs:   /70   Pulse 76   Temp 98.4 °F (36.9 °C)   Ht 160 cm (62.99\")   Wt 52.7 kg (116 lb 3.2 oz)   SpO2 99%   BMI 20.59 kg/m²     Physical Exam  Constitutional:       General: She is not in acute distress.     Appearance: She is not ill-appearing, toxic-appearing or diaphoretic.      Comments: Elderly -American female with lean body habitus.  She is in no obvious distress.   HENT:      Head: Normocephalic and atraumatic.      Nose: Nose normal.      Mouth/Throat:      Mouth: Mucous membranes are moist.      Pharynx: No oropharyngeal exudate or posterior oropharyngeal erythema.   Eyes:      Extraocular Movements: Extraocular movements intact.      Conjunctiva/sclera: Conjunctivae normal.      Pupils: Pupils are equal, round, and reactive to light.      Comments: No lid lag or lid retraction.   Neck:      Vascular: No carotid bruit.      Comments: Thyroidectomy scar appreciated. No palpable thyroid tissue. No palpable cervical lymph nodes.   Cardiovascular:      Rate and Rhythm: Normal rate and regular rhythm.      Pulses: Normal pulses.      Heart sounds: Normal heart sounds. No murmur heard.    No friction rub. No gallop.   Pulmonary:      Effort: Pulmonary effort is normal. No respiratory distress.      Breath sounds: Normal breath sounds. No stridor. No wheezing, rhonchi or rales.   Abdominal:      General: Bowel sounds are normal. There is no distension.      Palpations: Abdomen is soft. There is no mass.      Tenderness: There is no abdominal tenderness. There is no rebound.   Musculoskeletal:         General: No swelling, tenderness, deformity or signs of injury. Normal range of motion.      Cervical back: Full passive range of motion without pain, normal range of motion and neck supple. No rigidity or tenderness.      Right lower " leg: No edema.      Left lower leg: No edema.   Lymphadenopathy:      Cervical: No cervical adenopathy.   Skin:     General: Skin is warm and dry.      Coloration: Skin is not jaundiced or pale.      Findings: No bruising, erythema, lesion or rash.   Neurological:      General: No focal deficit present.      Mental Status: She is alert and oriented to person, place, and time.      Cranial Nerves: No cranial nerve deficit.      Sensory: No sensory deficit.      Motor: No weakness.      Coordination: Coordination normal.      Gait: Gait normal.      Deep Tendon Reflexes: Reflexes normal.      Comments: No tremors on outstretched fingers.   Psychiatric:         Mood and Affect: Mood normal.         Behavior: Behavior normal.         Thought Content: Thought content normal.        Result Review :         Most recent thyroid function studies:  12/10/2021 TSH 7.650 IU/mL  11/8/2021 TSH > 100 IU/mL, free t4 0.42 ng/dL  09/29/2021 TSH 37.71 IU/mL, free t4 0.79 ng/dL  08/11/2021 TSH 50.15 IU/mL, free t4   07/02/2021 TSH 0.008 IU/mL, free t4   10/10/2020 TSH <0.15 IU/mL, free t4 3.04 ng/dL  08/28/2020 TSH 0.007 IU/mL, free t4       Thyroid imaging studies from before her thyroidectomy:   Thyroid ultrasound 3/17/2017  Multinodular thyroid as described with largest nodule  measuring on the order of 2.3 cm in greatest dimension.    No neck US done since hr thyroidectomy.     Assessment and Plan    Diagnoses and all orders for this visit:    1. Thyroid cancer (HCC) (Primary)  -     TSH; Future  -     T4, Free; Future  -     Thyroglobulin Antibody; Future  -     Thyroglobulin; Future  -     Cancel: US Thyroid; Future  -     US Thyroid; Future    2. Postsurgical hypothyroidism  -     TSH; Future  -     T4, Free; Future    3. Chronic kidney disease with end stage renal failure on dialysis (HCC)  -     Comprehensive Metabolic Panel; Future    4. Tertiary hyperparathyroidism (HCC)  -     Comprehensive Metabolic Panel; Future  -      Vitamin D 25 Hydroxy; Future  -     Phosphorus; Future  -     Magnesium; Future  -     PTH, Intact; Future  -     Calcium, Ionized; Future    5. Vitamin D deficiency  -     Vitamin D 25 Hydroxy; Future    6. Screening for diabetes mellitus  -     Hemoglobin A1c; Future    7. Age-related osteoporosis without current pathological fracture  -     DEXA Bone Density Axial; Future    8. Prediabetes  -     Hemoglobin A1c; Future        1. Continue levothyroxine to 112 mcg daily as directed ie on an empty stomach. Wait to eat/drink > 30 minutes after ingestion. Space out any calcium of iron supplement >4 hrs from levothyroxine dose.    2. TSH goal 1-3 uIU/mL given history of stage I thyroid cancer and age group.  Reviewed thyroid cancer surveillance work up. No evidence of thyroid cancer recurrence.   Will check tumor markers and neck US yearly in/around January (or once every 2 years).     3.  Continue Vit D 10,000 IU three times a week. Will monitor labs. She is s/p 3.5 parathyroidectomy with 1/2 gland reimplantation in right arm. If PTH remains persistently elevated with D/W patient's nephrologist Dr Santana ([facility] location, [phone]). She states she is receiving activated Vit D with HD.    4. Counselled on tobacco cessation. Initial goal to cut down from 10 cig/day to 5 cig/day.    Please follow up in 6 months with labs and a neck US prior to the office visit.    I spent 40 minutes caring for Princess on this date of service. This time includes time spent by me in the following activities:preparing for the visit, reviewing tests, obtaining and/or reviewing a separately obtained history, performing a medically appropriate examination and/or evaluation , counseling and educating the patient/family/caregiver, ordering medications, tests, or procedures, referring and communicating with other health care professionals , documenting information in the medical record, independently interpreting results and  communicating that information with the patient/family/caregiver and care coordination  Follow Up   Return in about 6 months (around 1/22/2023).  Patient was given instructions and counseling regarding her condition or for health maintenance advice. Please see specific information pulled into the AVS if appropriate.

## 2022-08-05 ENCOUNTER — HOSPITAL ENCOUNTER (OUTPATIENT)
Dept: ULTRASOUND IMAGING | Facility: HOSPITAL | Age: 67
Discharge: HOME | End: 2022-08-05
Admitting: INTERNAL MEDICINE

## 2022-08-05 DIAGNOSIS — C73 THYROID CANCER: ICD-10-CM

## 2022-08-05 PROCEDURE — 76536 US EXAM OF HEAD AND NECK: CPT

## 2022-10-27 DIAGNOSIS — C73 THYROID CANCER: ICD-10-CM

## 2022-10-27 DIAGNOSIS — E89.0 POSTSURGICAL HYPOTHYROIDISM: ICD-10-CM

## 2022-10-27 RX ORDER — LEVOTHYROXINE SODIUM 112 MCG
TABLET ORAL
Qty: 90 TABLET | Refills: 2 | Status: SHIPPED | OUTPATIENT
Start: 2022-10-27 | End: 2023-02-03

## 2022-10-27 NOTE — TELEPHONE ENCOUNTER
Rx Refill Note  Requested Prescriptions     Pending Prescriptions Disp Refills   • Synthroid 112 MCG tablet 90 tablet 2     Sig: By mouth take 1 tab daily in AM as directed on empty stomach with water only.  Brand Medically Necessary      Last office visit with prescribing clinician: 7/22/2022      Next office visit with prescribing clinician: 1/9/2023            Sammie Reeder MA  10/27/22, 11:54 EDT

## 2022-12-28 DIAGNOSIS — E04.2 MULTIPLE THYROID NODULES: ICD-10-CM

## 2022-12-28 DIAGNOSIS — N18.6 CHRONIC KIDNEY DISEASE WITH END STAGE RENAL FAILURE ON DIALYSIS: ICD-10-CM

## 2022-12-28 DIAGNOSIS — C73 THYROID CANCER: ICD-10-CM

## 2022-12-28 DIAGNOSIS — E55.9 VITAMIN D DEFICIENCY: ICD-10-CM

## 2022-12-28 DIAGNOSIS — Z13.1 SCREENING FOR DIABETES MELLITUS: ICD-10-CM

## 2022-12-28 DIAGNOSIS — Z99.2 CHRONIC KIDNEY DISEASE WITH END STAGE RENAL FAILURE ON DIALYSIS: ICD-10-CM

## 2022-12-28 DIAGNOSIS — E89.0 POSTSURGICAL HYPOTHYROIDISM: ICD-10-CM

## 2022-12-28 DIAGNOSIS — E21.3 HYPERPARATHYROIDISM: ICD-10-CM

## 2022-12-28 DIAGNOSIS — R73.03 PREDIABETES: ICD-10-CM

## 2022-12-28 DIAGNOSIS — E21.3 HYPERPARATHYROIDISM: Primary | ICD-10-CM

## 2022-12-28 DIAGNOSIS — E21.2 TERTIARY HYPERPARATHYROIDISM: ICD-10-CM

## 2022-12-29 LAB — THYROGLOB AB SERPL-ACNC: <1 IU/ML (ref 0–0.9)

## 2023-01-18 LAB
25(OH)D3+25(OH)D2 SERPL-MCNC: 80.5 NG/ML (ref 30–100)
ALBUMIN SERPL-MCNC: 4 G/DL (ref 3.5–5.2)
ALBUMIN/GLOB SERPL: 1.3 G/DL
ALP SERPL-CCNC: 79 U/L (ref 39–117)
ALT SERPL-CCNC: 5 U/L (ref 1–33)
AST SERPL-CCNC: 13 U/L (ref 1–32)
BILIRUB SERPL-MCNC: 0.4 MG/DL (ref 0–1.2)
BUN SERPL-MCNC: 20 MG/DL (ref 8–23)
BUN/CREAT SERPL: 2.5 (ref 7–25)
CA-I SERPL ISE-MCNC: 4.9 MG/DL (ref 4.5–5.6)
CALCIUM SERPL-MCNC: 9 MG/DL (ref 8.6–10.5)
CHLORIDE SERPL-SCNC: 98 MMOL/L (ref 98–107)
CO2 SERPL-SCNC: 29.9 MMOL/L (ref 22–29)
CREAT SERPL-MCNC: 7.95 MG/DL (ref 0.57–1)
EGFRCR SERPLBLD CKD-EPI 2021: 5.1 ML/MIN/1.73
GLOBULIN SER CALC-MCNC: 3.1 GM/DL
GLUCOSE SERPL-MCNC: 90 MG/DL (ref 65–99)
HBA1C MFR BLD: 4.6 % (ref 4.8–5.6)
Lab: NORMAL
MAGNESIUM SERPL-MCNC: 2.1 MG/DL (ref 1.6–2.4)
PHOSPHATE SERPL-MCNC: 3.8 MG/DL (ref 2.5–4.5)
POTASSIUM SERPL-SCNC: 4.3 MMOL/L (ref 3.5–5.2)
PROT SERPL-MCNC: 7.1 G/DL (ref 6–8.5)
PTH-INTACT SERPL-MCNC: 120 PG/ML (ref 15–65)
REQUEST PROBLEM: NORMAL
SODIUM SERPL-SCNC: 142 MMOL/L (ref 136–145)
T4 FREE SERPL-MCNC: 1.67 NG/DL (ref 0.93–1.7)
THYROGLOB SERPL-MCNC: NORMAL NG/ML
TSH SERPL DL<=0.005 MIU/L-ACNC: 1.54 UIU/ML (ref 0.27–4.2)

## 2023-01-27 ENCOUNTER — HOSPITAL ENCOUNTER (OUTPATIENT)
Dept: BONE DENSITY | Facility: HOSPITAL | Age: 68
Discharge: HOME | End: 2023-01-27
Admitting: INTERNAL MEDICINE
Payer: MEDICARE

## 2023-01-27 DIAGNOSIS — M81.0 AGE-RELATED OSTEOPOROSIS WITHOUT CURRENT PATHOLOGICAL FRACTURE: ICD-10-CM

## 2023-01-27 PROCEDURE — 77080 DXA BONE DENSITY AXIAL: CPT

## 2023-01-27 NOTE — PROGRESS NOTES
Your bone density evaluation was reviewed.  It is consistent with significant osteoporosis.  We will discuss this in further detail at your appointment on February 3, 2023.    Until then, take strict fall precautions.  Maintain adequate calcium and vitamin D intake.    We will see you at your visit.

## 2023-02-03 ENCOUNTER — OFFICE VISIT (OUTPATIENT)
Dept: ENDOCRINOLOGY | Age: 68
End: 2023-02-03
Payer: MEDICARE

## 2023-02-03 VITALS
HEIGHT: 62 IN | SYSTOLIC BLOOD PRESSURE: 126 MMHG | DIASTOLIC BLOOD PRESSURE: 80 MMHG | OXYGEN SATURATION: 97 % | BODY MASS INDEX: 39.16 KG/M2 | TEMPERATURE: 97.2 F | HEART RATE: 67 BPM | WEIGHT: 212.8 LBS

## 2023-02-03 DIAGNOSIS — E21.2 TERTIARY HYPERPARATHYROIDISM: ICD-10-CM

## 2023-02-03 DIAGNOSIS — R73.03 PREDIABETES: ICD-10-CM

## 2023-02-03 DIAGNOSIS — Z72.0 TOBACCO USE: ICD-10-CM

## 2023-02-03 DIAGNOSIS — E78.5 DYSLIPIDEMIA: ICD-10-CM

## 2023-02-03 DIAGNOSIS — N18.6 CHRONIC KIDNEY DISEASE WITH END STAGE RENAL FAILURE ON DIALYSIS: ICD-10-CM

## 2023-02-03 DIAGNOSIS — E55.9 VITAMIN D DEFICIENCY: ICD-10-CM

## 2023-02-03 DIAGNOSIS — M81.0 AGE-RELATED OSTEOPOROSIS WITHOUT CURRENT PATHOLOGICAL FRACTURE: ICD-10-CM

## 2023-02-03 DIAGNOSIS — Z99.2 CHRONIC KIDNEY DISEASE WITH END STAGE RENAL FAILURE ON DIALYSIS: ICD-10-CM

## 2023-02-03 DIAGNOSIS — E89.0 POSTSURGICAL HYPOTHYROIDISM: Primary | ICD-10-CM

## 2023-02-03 DIAGNOSIS — C73 THYROID CANCER: ICD-10-CM

## 2023-02-03 PROCEDURE — 99215 OFFICE O/P EST HI 40 MIN: CPT | Performed by: INTERNAL MEDICINE

## 2023-02-03 RX ORDER — SUCROFERRIC OXYHYDROXIDE 500 MG/1
TABLET, CHEWABLE ORAL
COMMUNITY
Start: 2022-08-18

## 2023-02-03 RX ORDER — LEVOTHYROXINE SODIUM 0.1 MG/1
TABLET ORAL
Qty: 90 TABLET | Refills: 3 | Status: SHIPPED | OUTPATIENT
Start: 2023-02-03

## 2023-02-03 NOTE — PROGRESS NOTES
Chief Complaint  Thyroid Cancer (Patient states that her energy levels are a 5/10, she has no family hx of thyroid disease her weight is stable and she no longer has menstrual cycles )    Subjective        Patient comes to the office for follow up today on 2/3/2023 follow up for postsurgical hypothyroidism, history of end-stage renal disease on hemodialysis with tertiary hyperparathyroidism s/p surgery and new diagnosis of osteoporosis.      Interval history negative for any new symptoms, ER visits or hospitalizations.     The patient is s/p total thyroidectomy in May, 2017 for thyroid cancer and postsurgical hypothyroidism.  She is s/p 3.5 parathyroidectomy with 1/2 gland reimplantation in right arm.    She has end-stage renal disease and is on hemodialysis.    She is on levothyroxine to 112 mcg daily. She ran out 2 days ago.   Reviewed thyroid cancer surveillance work up.   No evidence of thyroid cancer recurrence at this time.     She is on a higher dose of Vit D 10,000 IU at three times a week.   She is s/p 3.5 parathyroidectomy with 1/2 gland reimplantation in right arm.   PTH improved since Vit D dose increase.   She follows up with her nephrologist Dr Santana ([facility] [place] [place] location, [phone]).   She states she is receiving activated Vit D with HD.    She continue   Latest Reference Range & Units 12/28/22 09:12 12/28/22 09:13   Glucose 65 - 99 mg/dL 90    Sodium 136 - 145 mmol/L 142    Potassium 3.5 - 5.2 mmol/L 4.3    CO2 22.0 - 29.0 mmol/L 29.9 (H)    Chloride 98 - 107 mmol/L 98    Creatinine 0.57 - 1.00 mg/dL 7.95 (H)    BUN 8 - 23 mg/dL 20    BUN/Creatinine Ratio 7.0 - 25.0  2.5 (L)    Calcium 8.6 - 10.5 mg/dL 9.0    Ionized Calcium 4.5 - 5.6 mg/dL 4.9    EGFR Result >60.0 mL/min/1.73 5.1 (L)    Alkaline Phosphatase 39 - 117 U/L 79    Total Protein 6.0 - 8.5 g/dL 7.1    ALT (SGPT) 1 - 33 U/L 5    AST (SGOT) 1 - 32 U/L 13    Total Bilirubin 0.0 - 1.2 mg/dL 0.4    Albumin 3.5 - 5.2 g/dL 4.0     Phosphorus 2.5 - 4.5 mg/dL 3.8    Hemoglobin A1C 4.80 - 5.60 % 4.60 (L)    PTH Intact (Serial Monitor) 15 - 65 pg/mL 120 (H)    TSH Baseline 0.270 - 4.200 uIU/mL 1.540    Free T4 0.93 - 1.70 ng/dL 1.67    Thyroglobulin ng/mL CANCELED    Thyroglobulin Ab 0.0 - 0.9 IU/mL  <1.0   Magnesium 1.6 - 2.4 mg/dL 2.1    25 Hydroxy, Vitamin D 30.0 - 100.0 ng/mL 80.5    (H): Data is abnormally high  (L): Data is abnormally low    January 27, 2023 BONE MINERAL DENSITOMETRY  HISTORY:  67 years-old female. Postmenopausal. Hyperparathyroidism.  COMPARISON:  None.   Lumbar T score is  -3.1.  Left hip density is lowest at the total hip where the T score is -2.7.   Right hip density is lowest at the total hip where the T score is -2.5.  Left forearm one third T score measures -3.1.  IMPRESSION: Osteoporosis at the hips and lumbar spine as well as at the left forearm.    ----------------------------------------------------------------------------------------------    4/8/2021 follow up:  Interval history is negative for any new symptoms, ER visits or hospitalizations.  Her dysphagia has resolved.  Patient still smokes and is not ready to quit.    She is on LT4 at 100 mcg daily.  She is on Vit D3 Vit D 10,000 IU twice a week, in stead of 50,000 IU twice a week.  Patent is not on Pravastatin, which is on her list.    Labs and imaging reviewed and discussed with patient.       Latest Reference Range & Units 01/03/22 12:50 04/01/22 10:37   Glucose 65 - 99 mg/dL 85 94   Calcium 8.7 - 10.3 mg/dL 9.1 8.5 (L)   EGFR African Am >59 mL/min/1.73 5 (L) [1]    Albumin 3.8 - 4.8 g/dL 4.5 4.6   Phosphorus 3.0 - 4.3 mg/dL 3.6 5.7 (H)   PTH Intact (Serial Monitor) 15 - 65 pg/mL 985 (H) 470 (H)   TSH Baseline 0.450 - 4.500 uIU/mL 12.100 (H) 3.820   Free T4 0.82 - 1.77 ng/dL 1.51 1.49   Thyroglobulin ng/mL <2.0 [3]    Thyroglobulin Ab 0.0 - 0.9 IU/mL <1.0 [4]    25 Hydroxy, Vitamin D 30.0 - 100.0 ng/mL 15.7 (L) [5] 51.3 [6]        Latest Reference Range  & Units 11/08/21 08:29 12/10/21 09:33 01/03/22 12:50 04/01/22 10:37   TSH Baseline 0.450 - 4.500 uIU/mL >100.000 (H) (E) 17.650 (H) (E) 12.100 (H) 3.820   Free T4 0.82 - 1.77 ng/dL 0.42 (L) (E)  1.51 1.49   Thyroglobulin ng/mL   <2.0 [1]    Thyroglobulin Ab 0.0 - 0.9 IU/mL   <1.0 [2]        THYROID US 1/13/2022:  COMPARISON:  03/17/2017  FINDINGS: The thyroidectomy site is typical in appearance, no mass nor  adenopathy nor abnormal fluid demonstrated at this location.  CONCLUSION:  1. Thyroidectomy site is typical in appearance.  2. No cervical adenopathy.      Princess Marie presents to [facility] ENDOCRINOLOGY  Thyroid Cancer     December 15, 2021;    Ms. Marie is a 66-year-old -American female who presents for evaluation and treatment recommendations for postsurgical hypothyroidism and history of thyroid cancer diagnosed in 2017.    Ms. Marie has a past medical history of hypertension and end-stage renal disease.  She receives hemodialysis 3 times a week.  She was noted to have hypercalcemia and was diagnosed with tertiary hyperparathyroidism.  She had 3-1/2 gland parathyroidectomy with 1/2 gland autotransplant in her right arm.    She also has a history of a multinodular goiter.  She had an FNA thyroid nodule biopsy of her right and left thyroid nodules.  Cytology was consistent with follicular epithelium, colloid and histiocytes i.e. [place] II.    At the time of parathyroid surgery, the patient underwent a total thyroidectomy 05/17/2017 by Dr Billie James.     5/17/2017:  · Parathyroid resection X 4, with 1/2 gland autotransplantation into right arm  · Total thyroidectomy      Surgical pathology 5/17/2017:  Final Diagnosis   1. LEFT INFERIOR PARATHYROID:               ENLARGED HYPERCELLULAR PARATHYROID GLAND.     2. PORTION OF LEFT SUPERIOR PARATHYROID:               HYPERCELLULAR PARATHYROID TISSUE.     3. PORTION RIGHT SUPERIOR PARATHYROID:               HYPERCELLULAR PARATHYROID  TISSUE.     4. RIGHT INFERIOR PARA THYROID:               HYPERCELLULAR PARATHYROID.               FRAGMENT OF BENIGN THYROID TISSUE.     5. 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               ABSENCE OF EXTRACAPSULAR EXTENSION OF TUMOR.               GLAND MARGIN FREE OF TUMOR.               ONE BENIGN LYMPH NODE.                  MULTINODULAR ADENOMATOUS GOITER.        Procedure: Total thyroidectomy.  Lymph node sampling: Single included lymph node.  Tumor laterality: Right.  Tumor focality: Unifocal.  Tumor size: 2 mm.  Histologic type: Papillary carcinoma, usual type.  Margins: Margins uninvolved by invasive carcinoma.  Angioinvasion: Not identified.  Lymphatic invasion: Not identified.  Extrathyroidal extension: Not identified.  Pathologic staging:     Primary tumor: pT1.     Regional lymph nodes: pN0.     Number examined: 1.     Number involved: 0.  Additional pathologic findings: Multinodular adenomatous goiter.    The patient's primary care physician has managed her postsurgical hyper hypothyroidism.  She has not been followed up for her history of stage I thyroid cancer.    She has had wide fluctuations in her TSH levels with frequent dose adjustments by her primary care physician.  Her primary care physician took her off LT4 three months ago and checked her labs.  He subsequently restarted her on levothyroxine at 25 mcg daily and graduallt increased the dose to 75 mcg daily.  She now has been on the levothyroxine 75 mcg daily since about a month.  Her thyroid function studies remain out of range for at least 3 months according to the patient, however records indicate that it has been much longer.  See below.    She has been experiencing fatigue, weight gain (5-6 lb over 6 mo), constipation, cold intolerance. She denies depression, excessive sleepiness, difficulty concentrating, skin/hair/nail changes. She denies anxiety, tremor, heat intolerance, polyphagia, weight loss,  "muscle weakness.    There is no family history of thyroid dysfunction.    Patient denies blurred vision, double vision, pain or swelling of the eyes.    Patient uses tobacco use.    Patient has occasional dysphagia. She had esophageal dilatation.  She has some odynophagia. She has a dry cough and hoarseness of voice.     There is no history of ionizing radiation to the head or neck.  There is no family history of thyroid cancer.    Patient is not on biotin, over the counter thyroid medications, Li, Amiodarone   No history of recent IV contrast dye.    Most recent thyroid function studies:  12/10/2021 TSH 7.650 IU/mL  11/8/2021 TSH > 100 IU/mL, free t4 0.42 ng/dL  09/29/2021 TSH 37.71 IU/mL, free t4 0.79 ng/dL  08/11/2021 TSH 50.15 IU/mL, free t4   07/02/2021 TSH 0.008 IU/mL, free t4   10/10/2020 TSH <0.15 IU/mL, free t4 3.04 ng/dL  08/28/2020 TSH 0.007 IU/mL, free t4     Review from January 3, 2022 shows:  Calcium 9.1 mg/dL, albumin 4.5 mg/dL,  pg/mL with a 25-hydroxy vitamin D level of 15.7 ng/dL.    Thyroid function studies show a TSH of 12 uIU/mL, free T4 1.5 ng/dL and an undetectable thyroglobulin with an undetectable thyroglobulin antibody.    Thyroid imaging studies from before her thyroidectomy:   Thyroid ultrasound 3/17/2017  Multinodular thyroid as described with largest nodule  measuring on the order of 2.3 cm in greatest dimension.    She did her thyroid US yesterday. We are awaiting results.       Objective   Vital Signs:   /80   Pulse 67   Temp 97.2 °F (36.2 °C) (Temporal)   Ht 157.5 cm (62\")   Wt 96.5 kg (212 lb 12.8 oz)   SpO2 97%   BMI 38.92 kg/m²     Physical Exam  Constitutional:       General: She is not in acute distress.     Appearance: She is not ill-appearing, toxic-appearing or diaphoretic.      Comments: Elderly -American female with lean body habitus.  She is in no obvious distress.   HENT:      Head: Normocephalic and atraumatic.      Nose: Nose normal.      " Mouth/Throat:      Mouth: Mucous membranes are moist.      Pharynx: No oropharyngeal exudate or posterior oropharyngeal erythema.   Eyes:      Extraocular Movements: Extraocular movements intact.      Conjunctiva/sclera: Conjunctivae normal.      Pupils: Pupils are equal, round, and reactive to light.      Comments: No lid lag or lid retraction.   Neck:      Vascular: No carotid bruit.      Comments: Thyroidectomy scar appreciated. No palpable thyroid tissue. No palpable cervical lymph nodes.   Cardiovascular:      Rate and Rhythm: Normal rate and regular rhythm.      Pulses: Normal pulses.      Heart sounds: Normal heart sounds. No murmur heard.    No friction rub. No gallop.   Pulmonary:      Effort: Pulmonary effort is normal. No respiratory distress.      Breath sounds: Normal breath sounds. No stridor. No wheezing, rhonchi or rales.   Abdominal:      General: Bowel sounds are normal. There is no distension.      Palpations: Abdomen is soft. There is no mass.      Tenderness: There is no abdominal tenderness. There is no rebound.   Musculoskeletal:         General: No swelling, tenderness, deformity or signs of injury. Normal range of motion.      Cervical back: Full passive range of motion without pain, normal range of motion and neck supple. No rigidity or tenderness.      Right lower leg: No edema.      Left lower leg: No edema.   Lymphadenopathy:      Cervical: No cervical adenopathy.   Skin:     General: Skin is warm and dry.      Coloration: Skin is not jaundiced or pale.      Findings: No bruising, erythema, lesion or rash.   Neurological:      General: No focal deficit present.      Mental Status: She is alert and oriented to person, place, and time.      Cranial Nerves: No cranial nerve deficit.      Sensory: No sensory deficit.      Motor: No weakness.      Coordination: Coordination normal.      Gait: Gait normal.      Deep Tendon Reflexes: Reflexes normal.      Comments: No tremors on outstretched  fingers.   Psychiatric:         Mood and Affect: Mood normal.         Behavior: Behavior normal.         Thought Content: Thought content normal.        Result Review :         Most recent thyroid function studies:  12/10/2021 TSH 7.650 IU/mL  11/8/2021 TSH > 100 IU/mL, free t4 0.42 ng/dL  09/29/2021 TSH 37.71 IU/mL, free t4 0.79 ng/dL  08/11/2021 TSH 50.15 IU/mL, free t4   07/02/2021 TSH 0.008 IU/mL, free t4   10/10/2020 TSH <0.15 IU/mL, free t4 3.04 ng/dL  08/28/2020 TSH 0.007 IU/mL, free t4     PROLIA      Thyroid imaging studies from before her thyroidectomy:   Thyroid ultrasound 3/17/2017  Multinodular thyroid as described with largest nodule  measuring on the order of 2.3 cm in greatest dimension.    No neck US done since hr thyroidectomy.     Assessment and Plan    Diagnoses and all orders for this visit:    1. Postsurgical hypothyroidism (Primary)  -     levothyroxine (SYNTHROID, LEVOTHROID) 100 MCG tablet; By mouth take 1 tab daily in AM as directed on empty stomach with water only.  Dispense: 90 tablet; Refill: 3  -     TSH; Future  -     T4, Free; Future    2. Tertiary hyperparathyroidism (HCC)  -     PTH, Intact; Future  -     Phosphorus; Future  -     Magnesium; Future  -     Calcium, Ionized; Future    3. Thyroid cancer (HCC)  -     levothyroxine (SYNTHROID, LEVOTHROID) 100 MCG tablet; By mouth take 1 tab daily in AM as directed on empty stomach with water only.  Dispense: 90 tablet; Refill: 3    4. Vitamin D deficiency  -     Comprehensive Metabolic Panel; Future  -     Vitamin D,25-Hydroxy; Future    5. Chronic kidney disease with end stage renal failure on dialysis (HCC)    6. Age-related osteoporosis without current pathological fracture  -     Calcium Citrate-Vitamin D 250-2.5 MG-MCG per tablet; Take 1 tablet by mouth 2 (Two) Times a Day.  Dispense: 180 tablet; Refill: 2  -     Cancel: Ambulatory Referral to [facility] For Infusion Treatment  -     Ambulatory Referral to [facility] For Infusion Treatment    7.  Tobacco use    8. Dyslipidemia  -     Lipid Panel; Future    9. Prediabetes  -     Hemoglobin A1c; Future    Other orders  -     denosumab (PROLIA) syringe 60 mg        1.  We will reduce her levothyroxine to 100 mcg daily as directed ie on an empty stomach. Wait to eat/drink > 30 minutes after ingestion. Space out any calcium of iron supplement >4 hrs from levothyroxine dose.    2. TSH goal 1-4 uIU/mL given history of stage I thyroid cancer, age group, her new diagnosis of osteoporosis.  Reviewed thyroid cancer surveillance work up. No evidence of thyroid cancer recurrence.   Will check tumor markers and neck US yearly.    3.  Osteoporosis.  Patient was advised to maintain adequate calcium intake through diet and supplements.  We will continue to maintain her 25-hydroxy vitamin D in an acceptable reference range > 30 ng/dL.  She was strongly discouraged from smoking and was counseled to discontinue her tobacco use as soon as possible.  She was advised strict fall precautions.  She is encouraged to maintain an exercise routine as tolerated, include weightbearing exercises as part of her routine.  Risks and benefits of Prolia were reviewed with the patient.  Patient stated understanding.  We will proceed with ordering Prolia every 6 months at the infusion center.    4.  Continue Vit D 10,000 IU three times a week. Will monitor labs. She is s/p 3.5 parathyroidectomy with 1/2 gland reimplantation in right arm. If PTH remains persistently elevated with D/W patient's nephrologist Dr Santana ([facility] location, [phone]). She states she is receiving activated Vit D with HD.    5. Counselled on tobacco cessation. Initial goal to cut down from 10 cig/day to 5 cig/day.    Please follow up in 3 months with labs.    I spent 40 minutes caring for Princess on this date of service. This time includes time spent by me in the following activities:preparing for the visit, reviewing tests, obtaining and/or reviewing a  separately obtained history, performing a medically appropriate examination and/or evaluation , counseling and educating the patient/family/caregiver, ordering medications, tests, or procedures, referring and communicating with other health care professionals , documenting information in the medical record, independently interpreting results and communicating that information with the patient/family/caregiver and care coordination  Follow Up   Return in about 3 months (around 5/3/2023) for f up 3 mo, labs one week prior.  Patient was given instructions and counseling regarding her condition or for health maintenance advice. Please see specific information pulled into the AVS if appropriate.

## 2023-03-17 ENCOUNTER — TRANSCRIBE ORDERS (OUTPATIENT)
Dept: ADMINISTRATIVE | Facility: HOSPITAL | Age: 68
End: 2023-03-17
Payer: MEDICARE

## 2023-03-17 DIAGNOSIS — J18.9 PNEUMONIA DUE TO INFECTIOUS ORGANISM, UNSPECIFIED LATERALITY, UNSPECIFIED PART OF LUNG: Primary | ICD-10-CM

## 2023-04-12 ENCOUNTER — OFFICE VISIT (OUTPATIENT)
Dept: ENDOCRINOLOGY | Age: 68
End: 2023-04-12
Payer: MEDICARE

## 2023-04-12 VITALS
HEART RATE: 62 BPM | TEMPERATURE: 97.3 F | WEIGHT: 115.2 LBS | HEIGHT: 62 IN | SYSTOLIC BLOOD PRESSURE: 130 MMHG | BODY MASS INDEX: 21.2 KG/M2 | OXYGEN SATURATION: 97 % | DIASTOLIC BLOOD PRESSURE: 80 MMHG

## 2023-04-12 DIAGNOSIS — M81.0 AGE-RELATED OSTEOPOROSIS WITHOUT CURRENT PATHOLOGICAL FRACTURE: ICD-10-CM

## 2023-04-12 DIAGNOSIS — E89.0 POSTSURGICAL HYPOTHYROIDISM: Primary | ICD-10-CM

## 2023-04-12 PROCEDURE — 3075F SYST BP GE 130 - 139MM HG: CPT | Performed by: INTERNAL MEDICINE

## 2023-04-12 PROCEDURE — 1160F RVW MEDS BY RX/DR IN RCRD: CPT | Performed by: INTERNAL MEDICINE

## 2023-04-12 PROCEDURE — 3079F DIAST BP 80-89 MM HG: CPT | Performed by: INTERNAL MEDICINE

## 2023-04-12 PROCEDURE — 1159F MED LIST DOCD IN RCRD: CPT | Performed by: INTERNAL MEDICINE

## 2023-04-12 PROCEDURE — 99214 OFFICE O/P EST MOD 30 MIN: CPT | Performed by: INTERNAL MEDICINE

## 2023-04-12 NOTE — PROGRESS NOTES
Chief complaint:  Osteoporosis, PRAVEEN, hypothyroidism    HPI:   - 67 year old female here for osteoporosis, hypothyroidism  - She has a history of a thyroidectomy and parathyroidectomy as well for hypercalcemia secondary to hyperparathyroidism  - The surgical pathology from her thyroidectomy also revealed a 2 mm right papillary microcarcinoma  - Prior fragility fractures: none  - Prior/current treatments for osteoporosis: none  - Risk factors for osteoporosis/bone loss: ESRD on HD, smoking  - Menopausal status: postmenopausal  - Family history of osteoporosis: none known  - Vitamin D and calcium intake: is taking vitamin D and calcium  - Last DXA: 1/2023 showing a most severe T-score of -3.1 in the lumbar spine      The following portions of the patient's history were reviewed and updated as appropriate: allergies, current medications, past family history, past medical history, past social history, past surgical history and problem list.    Review of Systems   Constitutional: Positive for fatigue.       Objective     Vitals:    04/12/23 1038   BP: 130/80   Pulse: 62   Temp: 97.3 °F (36.3 °C)   SpO2: 97%        Physical Exam  Constitutional:       Appearance: Normal appearance.   HENT:      Head: Normocephalic and atraumatic.   Eyes:      General: No scleral icterus.     Conjunctiva/sclera: Conjunctivae normal.   Pulmonary:      Effort: Pulmonary effort is normal. No respiratory distress.   Neurological:      Mental Status: She is alert.   Psychiatric:         Mood and Affect: Mood normal.         Behavior: Behavior normal.         Thought Content: Thought content normal.         Judgment: Judgment normal.         Assessment & Plan   1. Osteoporosis/PRAVEEN  - Will order Prolia  - Will check calcium before giving Prolia  - We did discuss that having ESRD does put her at higher risk of hypocalcemia with Prolia and I would like to check her lab work about 1 month after Prolia as well.  I did tell her to call if she has  muscle cramps after receiving Prolia  - She will cont. To take her vitamin D and calcium    2. Hypothyoridism  3. Papillary microcarcinoma  - TSH was 1.54 in 12/2022 so would cont. Levothyroxine 100 mcg daily    - Return to clinic in 6 months

## 2023-05-22 ENCOUNTER — LAB (OUTPATIENT)
Dept: OTHER | Facility: HOSPITAL | Age: 68
End: 2023-05-22
Payer: MEDICARE

## 2023-05-22 ENCOUNTER — INFUSION (OUTPATIENT)
Dept: ONCOLOGY | Facility: HOSPITAL | Age: 68
End: 2023-05-22
Payer: MEDICARE

## 2023-05-22 VITALS — HEIGHT: 64 IN | BODY MASS INDEX: 19.77 KG/M2 | RESPIRATION RATE: 15 BRPM | TEMPERATURE: 96.2 F

## 2023-05-22 DIAGNOSIS — M81.0 AGE-RELATED OSTEOPOROSIS WITHOUT CURRENT PATHOLOGICAL FRACTURE: ICD-10-CM

## 2023-05-22 DIAGNOSIS — M81.0 AGE-RELATED OSTEOPOROSIS WITHOUT CURRENT PATHOLOGICAL FRACTURE: Primary | ICD-10-CM

## 2023-05-22 LAB
25(OH)D3 SERPL-MCNC: 60 NG/ML (ref 30–100)
ALBUMIN SERPL-MCNC: 4.6 G/DL (ref 3.5–5.2)
ALBUMIN/GLOB SERPL: 1.2 G/DL
ALP SERPL-CCNC: 100 U/L (ref 39–117)
ALT SERPL W P-5'-P-CCNC: 14 U/L (ref 1–33)
ANION GAP SERPL CALCULATED.3IONS-SCNC: 21.4 MMOL/L (ref 5–15)
AST SERPL-CCNC: 21 U/L (ref 1–32)
BILIRUB SERPL-MCNC: 0.4 MG/DL (ref 0–1.2)
BUN SERPL-MCNC: 59 MG/DL (ref 8–23)
BUN/CREAT SERPL: 5.3 (ref 7–25)
CALCIUM SPEC-SCNC: 9.3 MG/DL (ref 8.6–10.5)
CHLORIDE SERPL-SCNC: 94 MMOL/L (ref 98–107)
CO2 SERPL-SCNC: 23.6 MMOL/L (ref 22–29)
CREAT SERPL-MCNC: 11.17 MG/DL (ref 0.57–1)
EGFRCR SERPLBLD CKD-EPI 2021: 3.4 ML/MIN/1.73
GLOBULIN UR ELPH-MCNC: 3.9 GM/DL
GLUCOSE SERPL-MCNC: 104 MG/DL (ref 65–99)
MAGNESIUM SERPL-MCNC: 2 MG/DL (ref 1.6–2.4)
PHOSPHATE SERPL-MCNC: 5.2 MG/DL (ref 2.5–4.5)
POTASSIUM SERPL-SCNC: 4.6 MMOL/L (ref 3.5–5.2)
PROT SERPL-MCNC: 8.5 G/DL (ref 6–8.5)
SODIUM SERPL-SCNC: 139 MMOL/L (ref 136–145)

## 2023-05-22 PROCEDURE — 25010000002 DENOSUMAB 60 MG/ML SOLUTION PREFILLED SYRINGE: Performed by: INTERNAL MEDICINE

## 2023-05-22 PROCEDURE — 83735 ASSAY OF MAGNESIUM: CPT | Performed by: INTERNAL MEDICINE

## 2023-05-22 PROCEDURE — 84100 ASSAY OF PHOSPHORUS: CPT | Performed by: INTERNAL MEDICINE

## 2023-05-22 PROCEDURE — 96372 THER/PROPH/DIAG INJ SC/IM: CPT

## 2023-05-22 PROCEDURE — 80053 COMPREHEN METABOLIC PANEL: CPT | Performed by: INTERNAL MEDICINE

## 2023-05-22 PROCEDURE — 82306 VITAMIN D 25 HYDROXY: CPT | Performed by: INTERNAL MEDICINE

## 2023-05-22 RX ADMIN — DENOSUMAB 60 MG: 60 INJECTION SUBCUTANEOUS at 14:48

## 2023-05-24 DIAGNOSIS — M81.0 AGE-RELATED OSTEOPOROSIS WITHOUT CURRENT PATHOLOGICAL FRACTURE: Primary | ICD-10-CM

## 2023-05-25 ENCOUNTER — TELEPHONE (OUTPATIENT)
Dept: ENDOCRINOLOGY | Age: 68
End: 2023-05-25
Payer: MEDICARE

## 2023-05-25 NOTE — TELEPHONE ENCOUNTER
5/25/2023 Called and left patient a detailed message to schedule a lab appt. 2 weeks from now.     ----- Message from Shyam Morse DO sent at 5/24/2023 11:53 AM EDT -----        ----- Message -----  From: Milad Campbell RegSched Rep  Sent: 5/23/2023   2:04 PM EDT  To: Shyam Morse DO

## 2023-10-26 ENCOUNTER — TELEPHONE (OUTPATIENT)
Dept: ENDOCRINOLOGY | Age: 68
End: 2023-10-26

## 2023-10-26 DIAGNOSIS — E89.0 POSTSURGICAL HYPOTHYROIDISM: ICD-10-CM

## 2023-10-26 DIAGNOSIS — C73 THYROID CANCER: ICD-10-CM

## 2023-10-26 RX ORDER — LEVOTHYROXINE SODIUM 0.1 MG/1
TABLET ORAL
Qty: 90 TABLET | Refills: 3 | Status: SHIPPED | OUTPATIENT
Start: 2023-10-26

## 2023-10-26 NOTE — TELEPHONE ENCOUNTER
Incoming Refill Request      Medication requested (name and dose): LEVOTHYROXINE 0.100MG TAB    Pharmacy where request should be sent: [address]    Additional details provided by patient: REFILL    Best call back number: [phone]    Does the patient have less than a 3 day supply:  [] Yes  [] No    Sheila De La Fuente Rep  10/26/23, 08:46 EDT

## 2023-11-27 ENCOUNTER — INFUSION (OUTPATIENT)
Dept: ONCOLOGY | Facility: HOSPITAL | Age: 68
End: 2023-11-27
Payer: MEDICARE

## 2023-11-27 ENCOUNTER — LAB (OUTPATIENT)
Dept: OTHER | Facility: HOSPITAL | Age: 68
End: 2023-11-27
Payer: MEDICARE

## 2023-11-27 VITALS — RESPIRATION RATE: 15 BRPM | HEIGHT: 64 IN | BODY MASS INDEX: 19.77 KG/M2 | TEMPERATURE: 97.5 F

## 2023-11-27 DIAGNOSIS — M81.0 AGE-RELATED OSTEOPOROSIS WITHOUT CURRENT PATHOLOGICAL FRACTURE: Primary | ICD-10-CM

## 2023-11-27 DIAGNOSIS — M81.0 AGE-RELATED OSTEOPOROSIS WITHOUT CURRENT PATHOLOGICAL FRACTURE: ICD-10-CM

## 2023-11-27 LAB
25(OH)D3 SERPL-MCNC: 43.5 NG/ML (ref 30–100)
ALBUMIN SERPL-MCNC: 4.8 G/DL (ref 3.5–5.2)
ALBUMIN/GLOB SERPL: 1.4 G/DL
ALP SERPL-CCNC: 66 U/L (ref 39–117)
ALT SERPL W P-5'-P-CCNC: 11 U/L (ref 1–33)
ANION GAP SERPL CALCULATED.3IONS-SCNC: 17.7 MMOL/L (ref 5–15)
AST SERPL-CCNC: 19 U/L (ref 1–32)
BILIRUB SERPL-MCNC: 0.4 MG/DL (ref 0–1.2)
BUN SERPL-MCNC: 70 MG/DL (ref 8–23)
BUN/CREAT SERPL: 5.5 (ref 7–25)
CALCIUM SPEC-SCNC: 10.4 MG/DL (ref 8.6–10.5)
CHLORIDE SERPL-SCNC: 98 MMOL/L (ref 98–107)
CO2 SERPL-SCNC: 23.3 MMOL/L (ref 22–29)
CREAT SERPL-MCNC: 12.69 MG/DL (ref 0.57–1)
EGFRCR SERPLBLD CKD-EPI 2021: 2.9 ML/MIN/1.73
GLOBULIN UR ELPH-MCNC: 3.5 GM/DL
GLUCOSE SERPL-MCNC: 76 MG/DL (ref 65–99)
MAGNESIUM SERPL-MCNC: 1.9 MG/DL (ref 1.6–2.4)
PHOSPHATE SERPL-MCNC: 5 MG/DL (ref 2.5–4.5)
POTASSIUM SERPL-SCNC: 4.9 MMOL/L (ref 3.5–5.2)
PROT SERPL-MCNC: 8.3 G/DL (ref 6–8.5)
SODIUM SERPL-SCNC: 139 MMOL/L (ref 136–145)

## 2023-11-27 PROCEDURE — 25010000002 DENOSUMAB 60 MG/ML SOLUTION PREFILLED SYRINGE: Performed by: INTERNAL MEDICINE

## 2023-11-27 PROCEDURE — 82306 VITAMIN D 25 HYDROXY: CPT | Performed by: INTERNAL MEDICINE

## 2023-11-27 PROCEDURE — 83735 ASSAY OF MAGNESIUM: CPT | Performed by: INTERNAL MEDICINE

## 2023-11-27 PROCEDURE — 80053 COMPREHEN METABOLIC PANEL: CPT | Performed by: INTERNAL MEDICINE

## 2023-11-27 PROCEDURE — 96372 THER/PROPH/DIAG INJ SC/IM: CPT

## 2023-11-27 PROCEDURE — 84100 ASSAY OF PHOSPHORUS: CPT | Performed by: INTERNAL MEDICINE

## 2023-11-27 RX ADMIN — DENOSUMAB 60 MG: 60 INJECTION SUBCUTANEOUS at 14:20

## (undated) DEVICE — CANN NASL CO2 TRULINK W/O2 A/

## (undated) DEVICE — ELECTRD BLD EDGE/INSUL1P 2.4X5.1MM STRL

## (undated) DEVICE — SKIN PREP TRAY W/CHG: Brand: MEDLINE INDUSTRIES, INC.

## (undated) DEVICE — GLV SURG BIOGEL LTX PF 6 1/2

## (undated) DEVICE — STPLR SKIN VISISTAT WD 35CT

## (undated) DEVICE — PK ENT MAJ 40

## (undated) DEVICE — NDL HYPO PRECISIONGLIDE REG 25G 1 1/2

## (undated) DEVICE — Device: Brand: DEFENDO AIR/WATER/SUCTION AND BIOPSY VALVE

## (undated) DEVICE — SUT VIC 5/0 PS2 18IN J495H

## (undated) DEVICE — DRAPE,REIN 53X77,STERILE: Brand: MEDLINE

## (undated) DEVICE — MAGNETIC DRAPE: Brand: DEVON

## (undated) DEVICE — TOWEL,OR,DSP,ST,NATURAL,DLX,4/PK,20PK/CS: Brand: MEDLINE

## (undated) DEVICE — IRRIGATOR BULB ASEPTO 60CC STRL

## (undated) DEVICE — TUBING, SUCTION, 1/4" X 10', STRAIGHT: Brand: MEDLINE

## (undated) DEVICE — SUT VIC 3/0 TIES 18IN J110T

## (undated) DEVICE — SKIN AFFIX SURG ADHESIVE 72/CS 0.55ML: Brand: MEDLINE

## (undated) DEVICE — DISPOSABLE BIPOLAR FORCEPS 4" (10.2CM) JEWELERS, STRAIGHT 0.4MM TIP AND 12 FT. (3.6M) CABLE: Brand: KIRWAN

## (undated) DEVICE — SUT VIC 2/0 TIES 18IN J111T

## (undated) DEVICE — BITEBLOCK OMNI BLOC

## (undated) DEVICE — SUT VIC 3/0 CT2 27IN J232H

## (undated) DEVICE — DRSNG TELFA PAD NONADH STR 1S 3X4IN

## (undated) DEVICE — SHEET, T, LAPAROTOMY, STERILE: Brand: MEDLINE

## (undated) DEVICE — CONTAINER,SPECIMEN,OR STERILE,4OZ: Brand: MEDLINE